# Patient Record
Sex: MALE | Race: WHITE | Employment: FULL TIME | URBAN - METROPOLITAN AREA
[De-identification: names, ages, dates, MRNs, and addresses within clinical notes are randomized per-mention and may not be internally consistent; named-entity substitution may affect disease eponyms.]

---

## 2018-04-12 ENCOUNTER — APPOINTMENT (OUTPATIENT)
Dept: ULTRASOUND IMAGING | Age: 18
End: 2018-04-12
Attending: PEDIATRICS
Payer: COMMERCIAL

## 2018-04-12 ENCOUNTER — HOSPITAL ENCOUNTER (EMERGENCY)
Age: 18
Discharge: HOME | End: 2018-04-13
Attending: PEDIATRICS
Payer: COMMERCIAL

## 2018-04-12 VITALS
HEART RATE: 67 BPM | TEMPERATURE: 98.3 F | SYSTOLIC BLOOD PRESSURE: 105 MMHG | DIASTOLIC BLOOD PRESSURE: 63 MMHG | OXYGEN SATURATION: 98 % | RESPIRATION RATE: 16 BRPM | WEIGHT: 171.08 LBS

## 2018-04-12 DIAGNOSIS — B27.90 MONONUCLEOSIS: Primary | ICD-10-CM

## 2018-04-12 LAB
ALBUMIN SERPL-MCNC: 3.9 G/DL (ref 3.5–5)
ALBUMIN/GLOB SERPL: 0.9 {RATIO} (ref 1.1–2.2)
ALP SERPL-CCNC: 93 U/L (ref 45–117)
ALT SERPL-CCNC: 20 U/L (ref 12–78)
ANION GAP SERPL CALC-SCNC: 7 MMOL/L (ref 5–15)
AST SERPL-CCNC: 16 U/L (ref 15–37)
BASOPHILS # BLD: 0 K/UL (ref 0–0.1)
BASOPHILS NFR BLD: 0 % (ref 0–1)
BILIRUB SERPL-MCNC: 0.7 MG/DL (ref 0.2–1)
BUN SERPL-MCNC: 12 MG/DL (ref 6–20)
BUN/CREAT SERPL: 13 (ref 12–20)
CALCIUM SERPL-MCNC: 9.3 MG/DL (ref 8.5–10.1)
CHLORIDE SERPL-SCNC: 100 MMOL/L (ref 97–108)
CO2 SERPL-SCNC: 27 MMOL/L (ref 21–32)
CREAT SERPL-MCNC: 0.95 MG/DL (ref 0.7–1.3)
DIFFERENTIAL METHOD BLD: ABNORMAL
EOSINOPHIL # BLD: 0.1 K/UL (ref 0–0.4)
EOSINOPHIL NFR BLD: 0 % (ref 0–7)
ERYTHROCYTE [DISTWIDTH] IN BLOOD BY AUTOMATED COUNT: 12.9 % (ref 11.5–14.5)
GLOBULIN SER CALC-MCNC: 4.4 G/DL (ref 2–4)
GLUCOSE SERPL-MCNC: 91 MG/DL (ref 65–100)
HCT VFR BLD AUTO: 40.4 % (ref 36.6–50.3)
HETEROPH AB SER QL: NEGATIVE
HGB BLD-MCNC: 13.6 G/DL (ref 12.1–17)
IMM GRANULOCYTES # BLD: 0 K/UL (ref 0–0.04)
IMM GRANULOCYTES NFR BLD AUTO: 0 % (ref 0–0.5)
LYMPHOCYTES # BLD: 1.8 K/UL (ref 0.8–3.5)
LYMPHOCYTES NFR BLD: 14 % (ref 12–49)
MCH RBC QN AUTO: 29.5 PG (ref 26–34)
MCHC RBC AUTO-ENTMCNC: 33.7 G/DL (ref 30–36.5)
MCV RBC AUTO: 87.6 FL (ref 80–99)
MONOCYTES # BLD: 1.9 K/UL (ref 0–1)
MONOCYTES NFR BLD: 15 % (ref 5–13)
NEUTS SEG # BLD: 8.7 K/UL (ref 1.8–8)
NEUTS SEG NFR BLD: 70 % (ref 32–75)
NRBC # BLD: 0 K/UL (ref 0–0.01)
NRBC BLD-RTO: 0 PER 100 WBC
PLATELET # BLD AUTO: 210 K/UL (ref 150–400)
PMV BLD AUTO: 9.2 FL (ref 8.9–12.9)
POTASSIUM SERPL-SCNC: 4.1 MMOL/L (ref 3.5–5.1)
PROT SERPL-MCNC: 8.3 G/DL (ref 6.4–8.2)
RBC # BLD AUTO: 4.61 M/UL (ref 4.1–5.7)
SODIUM SERPL-SCNC: 134 MMOL/L (ref 136–145)
WBC # BLD AUTO: 12.4 K/UL (ref 4.1–11.1)

## 2018-04-12 PROCEDURE — 99283 EMERGENCY DEPT VISIT LOW MDM: CPT

## 2018-04-12 PROCEDURE — 74011250636 HC RX REV CODE- 250/636: Performed by: PEDIATRICS

## 2018-04-12 PROCEDURE — 96374 THER/PROPH/DIAG INJ IV PUSH: CPT

## 2018-04-12 PROCEDURE — 80053 COMPREHEN METABOLIC PANEL: CPT | Performed by: PEDIATRICS

## 2018-04-12 PROCEDURE — 96375 TX/PRO/DX INJ NEW DRUG ADDON: CPT

## 2018-04-12 PROCEDURE — 86664 EPSTEIN-BARR NUCLEAR ANTIGEN: CPT | Performed by: PEDIATRICS

## 2018-04-12 PROCEDURE — 86308 HETEROPHILE ANTIBODY SCREEN: CPT | Performed by: PEDIATRICS

## 2018-04-12 PROCEDURE — 85025 COMPLETE CBC W/AUTO DIFF WBC: CPT | Performed by: PEDIATRICS

## 2018-04-12 PROCEDURE — 36415 COLL VENOUS BLD VENIPUNCTURE: CPT | Performed by: PEDIATRICS

## 2018-04-12 PROCEDURE — 86060 ANTISTREPTOLYSIN O TITER: CPT | Performed by: PEDIATRICS

## 2018-04-12 PROCEDURE — 76705 ECHO EXAM OF ABDOMEN: CPT

## 2018-04-12 PROCEDURE — 87040 BLOOD CULTURE FOR BACTERIA: CPT | Performed by: PEDIATRICS

## 2018-04-12 PROCEDURE — 87070 CULTURE OTHR SPECIMN AEROBIC: CPT | Performed by: PEDIATRICS

## 2018-04-12 RX ORDER — DEXAMETHASONE SODIUM PHOSPHATE 10 MG/ML
16 INJECTION INTRAMUSCULAR; INTRAVENOUS ONCE
Status: COMPLETED | OUTPATIENT
Start: 2018-04-12 | End: 2018-04-12

## 2018-04-12 RX ORDER — KETOROLAC TROMETHAMINE 30 MG/ML
30 INJECTION, SOLUTION INTRAMUSCULAR; INTRAVENOUS
Status: COMPLETED | OUTPATIENT
Start: 2018-04-12 | End: 2018-04-12

## 2018-04-12 RX ADMIN — SODIUM CHLORIDE 1000 ML: 900 INJECTION, SOLUTION INTRAVENOUS at 23:20

## 2018-04-12 RX ADMIN — DEXAMETHASONE SODIUM PHOSPHATE 16 MG: 10 INJECTION, SOLUTION INTRAMUSCULAR; INTRAVENOUS at 23:20

## 2018-04-12 RX ADMIN — KETOROLAC TROMETHAMINE 30 MG: 30 INJECTION, SOLUTION INTRAMUSCULAR; INTRAVENOUS at 23:20

## 2018-04-13 LAB — S PYO AG THROAT QL: NEGATIVE

## 2018-04-13 PROCEDURE — 87880 STREP A ASSAY W/OPTIC: CPT

## 2018-04-13 RX ORDER — IBUPROFEN 800 MG/1
800 TABLET ORAL
Qty: 20 TAB | Refills: 0 | Status: SHIPPED | OUTPATIENT
Start: 2018-04-13 | End: 2018-04-20

## 2018-04-13 RX ORDER — ONDANSETRON 4 MG/1
4 TABLET, ORALLY DISINTEGRATING ORAL
Qty: 10 TAB | Refills: 0 | Status: SHIPPED | OUTPATIENT
Start: 2018-04-13 | End: 2018-05-01

## 2018-04-13 NOTE — DISCHARGE INSTRUCTIONS
Mononucleosis in Teens: Care Instructions  Your Care Instructions    Mononucleosis, also called mono, is an infection that is usually caused by the Krystal-Barr virus. Mono is spread through contact with saliva, mucus from the nose and throat, and sometimes tears or blood. You can get mono by kissing a person who is infected. Or you may get it by sharing eating utensils or a drinking glass with someone who has mono. Mono may cause your spleen to swell. The spleen is an organ in the upper left side of your belly. A blow to the belly can cause a swollen spleen to break open. In very rare cases, the spleen may burst on its own. Most people recover fully after several weeks. But it may take several months before your normal energy is back. The lymph nodes in your neck may be larger than normal for up to 1 month. Getting lots of rest and keeping your schedule light will help you feel better. Time helps you recover. Follow-up care is a key part of your treatment and safety. Be sure to make and go to all appointments, and call your doctor if you are having problems. It's also a good idea to know your test results and keep a list of the medicines you take. How can you care for yourself at home? · Get plenty of rest. Stay in bed until you feel well enough to be up. · Drink plenty of fluids, enough so that your urine is light yellow or clear like water. If you have kidney, heart, or liver disease and have to limit fluids, talk with your doctor before you increase the amount of fluids you drink. · Take your medicines exactly as prescribed. Call your doctor if you think you are having a problem with your medicine. · For a sore throat, suck on lozenges or gargle with salt water. To make salt water, mix 1 teaspoon of salt in 8 ounces of warm water.   · Take an over-the-counter pain medicine, such as acetaminophen (Tylenol), ibuprofen (Advil, Motrin), or naproxen (Aleve), for a sore throat or headache or to lower a fever. Read and follow all instructions on the label. No one younger than 20 should take aspirin. It has been linked to Reye syndrome, a serious illness. · Do not take two or more pain medicines at the same time unless the doctor told you to. Many pain medicines have acetaminophen, which is Tylenol. Too much acetaminophen (Tylenol) can be harmful. · Do not play contact sports or lift anything heavy for 4 weeks after becoming ill with mono. Too much activity increases the chance that your spleen may break open. · Try not to spread the virus to others. Do not kiss or share dishes, glasses, eating utensils, or toothbrushes for at least 2 weeks. The virus is spread when saliva from an infected person gets in another person's mouth. It is hard to know how long you may be contagious. · If you know you have mono, do not donate blood. There is a chance of spreading the virus through blood products. When should you call for help? Call 911 anytime you think you may need emergency care. For example, call if:  ? · You passed out (lost consciousness). ?Call your doctor now or seek immediate medical care if:  ? · You have new or worse belly pain. ? · You have signs of needing more fluids. You have sunken eyes and a dry mouth, and you pass only a little urine. ? · You are dizzy or lightheaded, or you feel like you may faint. ? · You cannot swallow fluids. ? Watch closely for changes in your health, and be sure to contact your doctor if:  ? · You do not get better as expected. Where can you learn more? Go to http://attila-jeffrey.info/. Enter  in the search box to learn more about \"Mononucleosis in Teens: Care Instructions. \"  Current as of: March 3, 2017  Content Version: 11.4  © 1759-4222 [employer]. Care instructions adapted under license by [employer] (which disclaims liability or warranty for this information).  If you have questions about a medical condition or this instruction, always ask your healthcare professional. [place] [address] any warranty or liability for your use of this information. We hope that we have addressed all of your medical concerns. The examination and treatment you received in the Emergency Department were for an emergent problem and were not intended as complete care. It is important that you follow up with your healthcare provider(s) for ongoing care. If your symptoms worsen or do not improve as expected, and you are unable to reach your usual health care provider(s), you should return to the Emergency Department. Today's healthcare is undergoing tremendous change, and patient satisfaction surveys are one of the many tools to assess the quality of medical care. You may receive a survey from the [employer] organization regarding your experience in the Emergency Department. I hope that your experience has been completely positive, particularly the medical care that I provided. As such, please participate in the survey; anything less than excellent does not meet my expectations or intentions. Thank you for allowing us to provide you with medical care today. We realize that you have many choices for your emergency care needs. Please choose us in the future for any continued health care needs.       Shamar Castillo MD    [facility], [facility].   Office: [phone]            Recent Results (from the past 24 hour(s))   METABOLIC PANEL, COMPREHENSIVE    Collection Time: 04/12/18 11:11 PM   Result Value Ref Range    Sodium 134 (L) 136 - 145 mmol/L    Potassium 4.1 3.5 - 5.1 mmol/L    Chloride 100 97 - 108 mmol/L    CO2 27 21 - 32 mmol/L    Anion gap 7 5 - 15 mmol/L    Glucose 91 65 - 100 mg/dL    BUN 12 6 - 20 MG/DL    Creatinine 0.95 0.70 - 1.30 MG/DL    BUN/Creatinine ratio 13 12 - 20      GFR est AA >60 >60 ml/min/1.73m2    GFR est non-AA >60 >60 ml/min/1.73m2    Calcium 9.3 8.5 - 10.1 MG/DL    Bilirubin, total 0.7 0.2 - 1.0 MG/DL    ALT (SGPT) 20 12 - 78 U/L    AST (SGOT) 16 15 - 37 U/L    Alk. phosphatase 93 45 - 117 U/L    Protein, total 8.3 (H) 6.4 - 8.2 g/dL    Albumin 3.9 3.5 - 5.0 g/dL    Globulin 4.4 (H) 2.0 - 4.0 g/dL    A-G Ratio 0.9 (L) 1.1 - 2.2     CBC WITH AUTOMATED DIFF    Collection Time: 04/12/18 11:11 PM   Result Value Ref Range    WBC 12.4 (H) 4.1 - 11.1 K/uL    RBC 4.61 4.10 - 5.70 M/uL    HGB 13.6 12.1 - 17.0 g/dL    HCT 40.4 36.6 - 50.3 %    MCV 87.6 80.0 - 99.0 FL    MCH 29.5 26.0 - 34.0 PG    MCHC 33.7 30.0 - 36.5 g/dL    RDW 12.9 11.5 - 14.5 %    PLATELET 477 952 - 247 K/uL    MPV 9.2 8.9 - 12.9 FL    NRBC 0.0 0  WBC    ABSOLUTE NRBC 0.00 0.00 - 0.01 K/uL    NEUTROPHILS 70 32 - 75 %    LYMPHOCYTES 14 12 - 49 %    MONOCYTES 15 (H) 5 - 13 %    EOSINOPHILS 0 0 - 7 %    BASOPHILS 0 0 - 1 %    IMMATURE GRANULOCYTES 0 0.0 - 0.5 %    ABS. NEUTROPHILS 8.7 (H) 1.8 - 8.0 K/UL    ABS. LYMPHOCYTES 1.8 0.8 - 3.5 K/UL    ABS. MONOCYTES 1.9 (H) 0.0 - 1.0 K/UL    ABS. EOSINOPHILS 0.1 0.0 - 0.4 K/UL    ABS. BASOPHILS 0.0 0.0 - 0.1 K/UL    ABS. IMM. GRANS. 0.0 0.00 - 0.04 K/UL    DF AUTOMATED     MONONUCLEOSIS SCREEN    Collection Time: 04/12/18 11:11 PM   Result Value Ref Range    Mononucleosis screen NEGATIVE  NEG         [employer]    Result Date: 4/12/2018  Limited abdominal ultrasound HISTORY: Intermittent fever. Evaluation of splenic size. Targeted ultrasound of the area of interest demonstrates a homogeneous spleen that measures 12.8 x 5.9 x 11.4 cm, corresponding to a volume of 451 mL. IMPRESSION: Splenomegaly.

## 2018-04-13 NOTE — ED PROVIDER NOTES
Patient is a 21 y.o. male presenting with sore throat. The history is provided by the patient and a parent. Sore Throat    This is a new problem. Episode onset: 2 weeks ago with sorethroat and fever, abd pain. Treated by  with ABx and tamiflu. was betetr for a day but throat worse. Saw PCp 2 days ago and Neg Monospot,flu and strep. Started on augmentin. The problem has not changed (Still with low energy, HA, Sore throat and fever to 102) since onset. There has been a fever of 102 - 102.9 F. The fever has been present for 3 - 4 days. Associated symptoms include headaches, swollen glands and trouble swallowing (pain with swallwoing). Pertinent negatives include no diarrhea, no vomiting, no congestion, no drooling, no ear discharge, no ear pain, no shortness of breath, no stridor and no cough. He has had no exposure to strep or mono. He has tried NSAIDs and acetaminophen for the symptoms. The treatment provided mild relief. Past Medical History:   Diagnosis Date    Asthma        History reviewed. No pertinent surgical history. History reviewed. No pertinent family history. Social History     Social History    Marital status: SINGLE     Spouse name: N/A    Number of children: N/A    Years of education: N/A     Occupational History    Not on file. Social History Main Topics    Smoking status: Never Smoker    Smokeless tobacco: Never Used    Alcohol use Not on file    Drug use: Not on file    Sexual activity: Not on file     Other Topics Concern    Not on file     Social History Narrative    No narrative on file         ALLERGIES: Review of patient's allergies indicates no known allergies. Review of Systems   Constitutional: Positive for activity change, fatigue and fever. Negative for appetite change and chills. HENT: Positive for sore throat and trouble swallowing (pain with swallwoing). Negative for congestion, drooling, ear discharge, ear pain and rhinorrhea.     Eyes: Negative for photophobia and visual disturbance. Respiratory: Negative for cough, shortness of breath, wheezing and stridor. Cardiovascular: Negative for chest pain. Gastrointestinal: Negative for abdominal pain, diarrhea and vomiting. Endocrine: Negative for polydipsia and polyuria. Genitourinary: Negative for decreased urine volume and dysuria. Musculoskeletal: Negative for back pain, neck pain and neck stiffness. Skin: Negative for rash. Allergic/Immunologic: Negative for immunocompromised state. Neurological: Positive for headaches. Hematological: Positive for adenopathy. Does not bruise/bleed easily. Psychiatric/Behavioral: Negative for confusion. Vitals:    04/12/18 2056 04/12/18 2133   BP: 102/63    Pulse: (!) 101    Resp: 20    Temp: 100.1 °F (37.8 °C) 98.5 °F (36.9 °C)   SpO2: 97%    Weight: 77.6 kg (171 lb 1.2 oz)             Physical Exam   Physical Exam   Constitutional: Appears well-developed and well-nourished. active. No distress. HENT:   Head: NCAT  Ears: Right Ear: Tympanic membrane normal. Left Ear: Tympanic membrane normal.   Nose: Nose normal. No nasal discharge. Mouth/Throat: Mucous membranes are moist. Pharynx is normal. tonsils very swollen and exudates  Eyes: Conjunctivae are normal. Right eye exhibits no discharge. Left eye exhibits no discharge. Neck: Normal range of motion. Neck supple. Cardiovascular: Normal rate, regular rhythm, S1 normal and S2 normal.  No murmur. 2+ distal pulses   Pulmonary/Chest: Effort normal and breath sounds normal. No nasal flaring or stridor. No respiratory distress. no wheezes. no rhonchi. no rales. no retraction. Abdominal: Soft. . No tenderness. no guarding. No hernia. No masses or HSM appreciated on exam  Musculoskeletal: Normal range of motion. no edema, no tenderness, no deformity and no signs of injury. Lymphadenopathy: b/l cervical adenopathy. Neurological:  alert. normal strength. normal muscle tone.  No focal deficits. EOMI PERRL  Skin: Skin is warm and dry. Capillary refill takes less than 3 seconds. Turgor is normal. No petechiae, no purpura and no rash noted. No cyanosis. MDM    Patient is well hydrated, well appearing, and in no respiratory distress. Physical exam is reassuring, and without signs of serious illness. Pt with clinical signs and symptoms, as well as laboratory findings consistent with mononucleosis. Mono spot negative. Sending Throat Cx but with Monos, large spleen and exam findings Mono most likely. No evidence of airway compromise or peritonsillar abscess. Gave bolus, toradol and decadron. Patient is stable for discharge home with pain control, oral hydration. Throat Cx pending. I d not see need for ABx but family can cont if they choose. Will have patient follow up with PCP in 1-2 days. Instructed to call or return with worsening pain, respiratory distress, trismus, or other concerning symptoms. Awaiting on Cx, EBV panel and ASO. ICD-10-CM ICD-9-CM   1. Mononucleosis B27.90 075       Current Discharge Medication List      START taking these medications    Details   ibuprofen (MOTRIN) 800 mg tablet Take 1 Tab by mouth every six (6) hours as needed for Pain for up to 7 days. Qty: 20 Tab, Refills: 0      ondansetron (ZOFRAN ODT) 4 mg disintegrating tablet Take 1 Tab by mouth every eight (8) hours as needed for Nausea for up to 10 doses. Qty: 10 Tab, Refills: 0             Follow-up Information     Follow up With Details Comments Contact Info    [facility] In 3 days            I have reviewed discharge instructions with the caregiver. The patient verbalized understanding.     12:06 AM  Erica Hoover M.D.    ED Course       Procedures

## 2018-04-13 NOTE — ED NOTES
Pt discharged home with parent/guardian. Pt acting age appropriately, respirations regular and unlabored, cap refill less than two seconds. Skin pink, dry and warm. Lungs clear bilaterally. No further complaints at this time. Pt verbalized understanding of discharge paperwork and has no further questions at this time. Education provided about continuation of care, follow up care and medication administration. Pt able to provided teach back about discharge instructions.

## 2018-04-13 NOTE — ED TRIAGE NOTES
Sick on and off for 2 weeks with intermittent fever. Urgent care gave him tamiflu and an antibiotic because they didn't know what it was per family. Tested on Tuesday for mono that was negative. Today his fever was 103 with body aches and a headache.

## 2018-04-14 LAB
ASO AB SERPL-ACNC: 113 IU/ML (ref 0–200)
BACTERIA SPEC CULT: NORMAL
EBV EA IGG SER-ACNC: <9 U/ML (ref 0–8.9)
EBV NA IGG SER-ACNC: 112 U/ML (ref 0–17.9)
EBV VCA IGG SER-ACNC: 82 U/ML (ref 0–17.9)
EBV VCA IGM SER-ACNC: <36 U/ML (ref 0–35.9)
INTERPRETATION, 169995: ABNORMAL
SERVICE CMNT-IMP: NORMAL

## 2018-04-18 LAB
BACTERIA SPEC CULT: NORMAL
SERVICE CMNT-IMP: NORMAL

## 2018-04-25 ENCOUNTER — HOSPITAL ENCOUNTER (OUTPATIENT)
Dept: ULTRASOUND IMAGING | Age: 18
Discharge: HOME | End: 2018-04-25
Attending: PEDIATRICS
Payer: COMMERCIAL

## 2018-04-25 DIAGNOSIS — B27.90 MONONUCLEOSIS: ICD-10-CM

## 2018-04-25 PROCEDURE — 76705 ECHO EXAM OF ABDOMEN: CPT

## 2018-05-01 ENCOUNTER — OFFICE VISIT (OUTPATIENT)
Dept: INTERNAL MEDICINE CLINIC | Age: 18
End: 2018-05-01

## 2018-05-01 VITALS
RESPIRATION RATE: 18 BRPM | HEART RATE: 75 BPM | HEIGHT: 71 IN | DIASTOLIC BLOOD PRESSURE: 64 MMHG | TEMPERATURE: 98 F | SYSTOLIC BLOOD PRESSURE: 112 MMHG | WEIGHT: 167 LBS | BODY MASS INDEX: 23.38 KG/M2 | OXYGEN SATURATION: 98 %

## 2018-05-01 DIAGNOSIS — Z87.898 H/O SPLENOMEGALY: ICD-10-CM

## 2018-05-01 DIAGNOSIS — Z23 ENCOUNTER FOR IMMUNIZATION: Primary | ICD-10-CM

## 2018-05-01 PROBLEM — J45.909 CHILDHOOD ASTHMA: Status: ACTIVE | Noted: 2018-05-01

## 2018-05-01 NOTE — MR AVS SNAPSHOT
[address] [facility], [address] [place] [address] 
[phone] Patient: Andry Garduno MRN: [id number] :[id number] Visit Information Date & Time Provider Department Dept. Phone Encounter #  
 2018  1:40 PM [facility] [phone] Follow-up Instructions Return in about 1 year (around 2019) for CPE. Upcoming Health Maintenance Date Due Hepatitis A Peds Age 1-18 (1 of 2 - Standard Series) 1999 DTaP/Tdap/Td series (1 - Tdap) 2005 HPV Age 9Y-34Y (1 of 1 - Male 3 Dose Series) 2009 Influenza Age 5 to Adult 2018 Allergies as of 2018  Review Complete On: 2018 By: Bonita Herrera LPN No Known Allergies Current Immunizations  Never Reviewed No immunizations on file. Not reviewed this visit You Were Diagnosed With   
  
 Codes Comments Encounter for immunization    -  Primary ICD-10-CM: V64 ICD-9-CM: V03.89 Vitals BP Pulse Temp Resp Height(growth percentile) 112/64 (14 %/ 12 %)* (BP 1 Location: Right arm, BP Patient Position: Sitting) 75 98 °F (36.7 °C) (Oral) 18 5' 11\" (1.803 m) (69 %, Z= 0.51) Weight(growth percentile) SpO2 BMI Smoking Status 167 lb (75.8 kg) (69 %, Z= 0.49) 98% 23.29 kg/m2 (57 %, Z= 0.18) Never Smoker *BP percentiles are based on NHBPEP's 4th Report Growth percentiles are based on CDC 2-20 Years data. BMI and BSA Data Body Mass Index Body Surface Area  
 23.29 kg/m 2 1.95 m 2 Preferred Pharmacy Pharmacy Name Phone [id number] [address] [phone] Your Updated Medication List  
  
   
This list is accurate as of 18  2:01 PM.  Always use your most recent med list.  
  
  
  
  
 human papillomav vac,9-ciera(PF) Susp injection Commonly known as:  GARDASIL  
 0.5 mL by IntraMUSCular route once for 1 dose. ondansetron 4 mg disintegrating tablet Commonly known as:  ZOFRAN ODT Take 1 Tab by mouth every eight (8) hours as needed for Nausea for up to 10 doses. RHINOCORT AQUA 32 mcg/actuation nasal spray Generic drug:  budesonide  
by Nasal route. SINGULAIR 10 mg tablet Generic drug:  montelukast  
Take 10 mg by mouth daily. SYMBICORT 80-4.5 mcg/actuation Hfaa Generic drug:  budesonide-formoterol Take 2 Puffs by inhalation two (2) times a day. Prescriptions Sent to Pharmacy Refills  
 human papillomav vac,9-ciera,PF, (GARDASIL) susp injection 1 Si.5 mL by IntraMUSCular route once for 1 dose. Class: Normal  
 Pharmacy: [id number]- Sangeeta Adamson, [address] [place] [address] #: [phone] Route: IntraMUSCular Follow-up Instructions Return in about 1 year (around 2019) for CPE. Patient Instructions HPV (Human Papillomavirus) Vaccine Gardasil®: What You Need to Know What is HPV? Genital human papillomavirus (HPV) is the most common sexually transmitted virus in the [place]. More than half of sexually active men and women are infected with HPV at some time in their lives. About 20 million Americans are currently infected, and about 6 million more get infected each year. HPV is usually spread through sexual contact. Most HPV infections don't cause any symptoms, and go away on their own. But HPV can cause cervical cancer in women. Cervical cancer is the 2nd leading cause of cancer deaths among women around the world. In the [place], about 12,000 women get cervical cancer every year and about 4,000 are expected to die from it.  
HPV is also associated with several less common cancers, such as vaginal and vulvar cancers in women, and anal and oropharyngeal (back of the throat, including base of tongue and tonsils) cancers in both men and women. HPV can also cause genital warts and warts in the throat. There is no cure for HPV infection, but some of the problems it causes can be treated. HPV vaccine-Why get vaccinated? The HPV vaccine you are getting is one of two vaccines that can be given to prevent HPV. It may be given to both males and females. This vaccine can prevent most cases of cervical cancer in females, if it is given before exposure to the virus. In addition, it can prevent vaginal and vulvar cancer in females, and genital warts and anal cancer in both males and females. Protection from HPV vaccine is expected to be long-lasting. But vaccination is not a substitute for cervical cancer screening. Women should still get regular Pap tests. Who should get this HPV vaccine and when? HPV vaccine is given as a 3-dose series · 1st Dose: Now 
· 2nd Dose: 1 to 2 months after Dose 1 · 3rd Dose: 6 months after Dose 1 Additional (booster) doses are not recommended. Routine vaccination · This HPV vaccine is recommended for girls and boys 6or 15years of age. It may be given starting at age 5. Why is HPV vaccine recommended at 6or 15years of age? HPV infection is easily acquired, even with only one sex partner. That is why it is important to get HPV vaccine before any sexual contact takes place. Also, response to the vaccine is better at this age than at older ages. Catch-up vaccination This vaccine is recommended for the following people who have not completed the 3-dose series: · Females 15 through 32years of age · Males 15 through 24years of age This vaccine may be given to men 25 through 32years of age who have not completed the 3-dose series. It is recommended for men through age 32 who have sex with men or whose immune system is weakened because of HIV infection, other illness, or medications. HPV vaccine may be given at the same time as other vaccines. Some people should not get HPV vaccine or should wait · Anyone who has ever had a life-threatening allergic reaction to any component of HPV vaccine, or to a previous dose of HPV vaccine, should not get the vaccine. Tell your doctor if the person getting vaccinated has any severe allergies, including an allergy to yeast. 
· HPV vaccine is not recommended for pregnant women. However, receiving HPV vaccine when pregnant is not a reason to consider terminating the pregnancy. Women who are breast feeding may get the vaccine. · People who are mildly ill when a dose of HPV vaccine is planned can still be vaccinated. People with a moderate or severe illness should wait until they are better. What are the risks from this vaccine? This HPV vaccine has been used in the [place] and around the world for about six years and has been very safe. However, any medicine could possibly cause a serious problem, such as a severe allergic reaction. The risk of any vaccine causing a serious injury, or death, is extremely small. Life-threatening allergic reactions from vaccines are very rare. If they do occur, it would be within a few minutes to a few hours after the vaccination. Several mild to moderate problems are known to occur with this HPV vaccine. These do not last long and go away on their own. · Reactions in the arm where the shot was given: 
¨ Pain (about 8 people in 10) ¨ Redness or swelling (about 1 person in 4) · Fever ¨ Mild (100°F) (about 1 person in 10) ¨ Moderate (102°F) (about 1 person in 72) · Other problems: 
¨ Headache (about 1 person in 3) · Fainting: Brief fainting spells and related symptoms (such as jerking movements) can happen after any medical procedure, including vaccination. Sitting or lying down for about 15 minutes after a vaccination can help prevent fainting and injuries caused by falls. Tell your doctor if the patient feels dizzy or light-headed, or has vision changes or ringing in the ears. Like all vaccines, HPV vaccines will continue to be monitored for unusual or severe problems. What if there is a serious reaction? What should I look for? · Look for anything that concerns you, such as signs of a severe allergic reaction, very high fever, or behavior changes. Signs of a severe allergic reaction can include hives, swelling of the face and throat, difficulty breathing, a fast heartbeat, dizziness, and weakness. These would start a few minutes to a few hours after the vaccination. What should I do? · If you think it is a severe allergic reaction or other emergency that can't wait, call 9-1-1 or get the person to the nearest hospital. Otherwise, call your doctor. · Afterward, the reaction should be reported to the Vaccine Adverse Event Reporting System (VAERS). Your doctor might file this report, or you can do it yourself through the VAERS web site at www.vaers. [employer].gov, or by calling 1-803.102.3772. VAERS is only for reporting reactions. They do not give medical advice. The National Vaccine Injury Compensation Program 
The National Vaccine Injury Compensation Program (VICP) is a federal program that was created to compensate people who may have been injured by certain vaccines. Persons who believe they may have been injured by a vaccine can learn about the program and about filing a claim by calling 5-615.476.8454 or visiting the [employer] website at www.[facility].gov/vaccinecompensation. How can I learn more? · Ask your doctor. · Call your local or state health department. · Contact the Centers for Disease Control and Prevention (CDC): 
¨ Call 4-277.312.2891 (1-800-CDC-INFO) or ¨ Visit the CDC's website at www.cdc.gov/vaccines. Vaccine Information Statement (Interim) HPV Vaccine (Gardasil) 
(5/17/2013) 42 U. [employer] Cons 873AQ-52 Department of [facility] and [employer] Centers for Disease Control and Prevention Many Vaccine Information Statements are available in Latvian and other languages. See www.immunize.org/vis. Muchas hojas de información sobre vacunas están disponibles en español y en otros idiomas. Visite www.immunize.org/vis. Care instructions adapted under license by [employer] (which disclaims liability or warranty for this information). If you have questions about a medical condition or this instruction, always ask your healthcare professional. [address] any warranty or liability for your use of this information. Introducing hospitals & HEALTH SERVICES! Dear Esvin Jaramillo: Thank you for requesting a Latinda account. Our records indicate that you already have an active Latinda account. You can access your account anytime at [email or web address] Did you know that you can access your hospital and ER discharge instructions at any time in Latinda? You can also review all of your test results from your hospital stay or ER visit. Additional Information If you have questions, please visit the Frequently Asked Questions section of the [employer]inda website at [email or web address] Remember, Latinda is NOT to be used for urgent needs. For medical emergencies, dial 911. Now available from your iPhone and Android! Please provide this summary of care documentation to your next provider. Your primary care clinician is listed as [address]. If you have any questions after today's visit, please call [phone].

## 2018-05-01 NOTE — PATIENT INSTRUCTIONS
HPV (Human Papillomavirus) Vaccine Gardasil®: What You Need to Know  What is HPV? Genital human papillomavirus (HPV) is the most common sexually transmitted virus in the [place]. More than half of sexually active men and women are infected with HPV at some time in their lives. About 20 million Americans are currently infected, and about 6 million more get infected each year. HPV is usually spread through sexual contact. Most HPV infections don't cause any symptoms, and go away on their own. But HPV can cause cervical cancer in women. Cervical cancer is the 2nd leading cause of cancer deaths among women around the world. In the [place], about 12,000 women get cervical cancer every year and about 4,000 are expected to die from it. HPV is also associated with several less common cancers, such as vaginal and vulvar cancers in women, and anal and oropharyngeal (back of the throat, including base of tongue and tonsils) cancers in both men and women. HPV can also cause genital warts and warts in the throat. There is no cure for HPV infection, but some of the problems it causes can be treated. HPV vaccine-Why get vaccinated? The HPV vaccine you are getting is one of two vaccines that can be given to prevent HPV. It may be given to both males and females. This vaccine can prevent most cases of cervical cancer in females, if it is given before exposure to the virus. In addition, it can prevent vaginal and vulvar cancer in females, and genital warts and anal cancer in both males and females. Protection from HPV vaccine is expected to be long-lasting. But vaccination is not a substitute for cervical cancer screening. Women should still get regular Pap tests. Who should get this HPV vaccine and when? HPV vaccine is given as a 3-dose series  · 1st Dose: Now  · 2nd Dose: 1 to 2 months after Dose 1  · 3rd Dose: 6 months after Dose 1  Additional (booster) doses are not recommended.   Routine vaccination  · This HPV vaccine is recommended for girls and boys 6or 15years of age. It may be given starting at age 5. Why is HPV vaccine recommended at 6or 15years of age? HPV infection is easily acquired, even with only one sex partner. That is why it is important to get HPV vaccine before any sexual contact takes place. Also, response to the vaccine is better at this age than at older ages. Catch-up vaccination  This vaccine is recommended for the following people who have not completed the 3-dose series:  · Females 15 through 32years of age  · Males 15 through 24years of age  This vaccine may be given to men 25 through 32years of age who have not completed the 3-dose series. It is recommended for men through age 32 who have sex with men or whose immune system is weakened because of HIV infection, other illness, or medications. HPV vaccine may be given at the same time as other vaccines. Some people should not get HPV vaccine or should wait  · Anyone who has ever had a life-threatening allergic reaction to any component of HPV vaccine, or to a previous dose of HPV vaccine, should not get the vaccine. Tell your doctor if the person getting vaccinated has any severe allergies, including an allergy to yeast.  · HPV vaccine is not recommended for pregnant women. However, receiving HPV vaccine when pregnant is not a reason to consider terminating the pregnancy. Women who are breast feeding may get the vaccine. · People who are mildly ill when a dose of HPV vaccine is planned can still be vaccinated. People with a moderate or severe illness should wait until they are better. What are the risks from this vaccine? This HPV vaccine has been used in the [place] and around the world for about six years and has been very safe. However, any medicine could possibly cause a serious problem, such as a severe allergic reaction.  The risk of any vaccine causing a serious injury, or death, is extremely small.  Life-threatening allergic reactions from vaccines are very rare. If they do occur, it would be within a few minutes to a few hours after the vaccination. Several mild to moderate problems are known to occur with this HPV vaccine. These do not last long and go away on their own. · Reactions in the arm where the shot was given:  ¨ Pain (about 8 people in 10)  ¨ Redness or swelling (about 1 person in 4)  · Fever  ¨ Mild (100°F) (about 1 person in 10)  ¨ Moderate (102°F) (about 1 person in 65)  · Other problems:  ¨ Headache (about 1 person in 3)  · Fainting: Brief fainting spells and related symptoms (such as jerking movements) can happen after any medical procedure, including vaccination. Sitting or lying down for about 15 minutes after a vaccination can help prevent fainting and injuries caused by falls. Tell your doctor if the patient feels dizzy or light-headed, or has vision changes or ringing in the ears. Like all vaccines, HPV vaccines will continue to be monitored for unusual or severe problems. What if there is a serious reaction? What should I look for? · Look for anything that concerns you, such as signs of a severe allergic reaction, very high fever, or behavior changes. Signs of a severe allergic reaction can include hives, swelling of the face and throat, difficulty breathing, a fast heartbeat, dizziness, and weakness. These would start a few minutes to a few hours after the vaccination. What should I do? · If you think it is a severe allergic reaction or other emergency that can't wait, call 9-1-1 or get the person to the nearest hospital. Otherwise, call your doctor. · Afterward, the reaction should be reported to the Vaccine Adverse Event Reporting System (VAERS). Your doctor might file this report, or you can do it yourself through the VAERS web site at www.vaers. hhs.gov, or by calling 1-239.934.5651. VAERS is only for reporting reactions. They do not give medical advice.   The National Vaccine Injury Compensation Program  The National Vaccine Injury Compensation Program (VICP) is a federal program that was created to compensate people who may have been injured by certain vaccines. Persons who believe they may have been injured by a vaccine can learn about the program and about filing a claim by calling 9-529.747.5021 or visiting the [employer] website at www.[facility].gov/vaccinecompensation. How can I learn more? · Ask your doctor. · Call your local or state health department. · Contact the Centers for Disease Control and Prevention (CDC):  ¨ Call 4-481.406.1336 (1-800-CDC-INFO) or  ¨ Visit the CDC's website at www.cdc.gov/vaccines. Vaccine Information Statement (Interim)  HPV Vaccine (Gardasil)  (5/17/2013)  42 YANDY Wongsam Phelps [id number]  Department of Health and Human Services  Centers for Disease Control and Prevention  Many Vaccine Information Statements are available in Yoruba and other languages. See www.immunize.org/vis. Muchas hojas de información sobre vacunas están disponibles en español y en otros idiomas. Visite www.immunize.org/vis. Care instructions adapted under license by [employer] (which disclaims liability or warranty for this information). If you have questions about a medical condition or this instruction, always ask your healthcare professional. [address] any warranty or liability for your use of this information.

## 2018-05-01 NOTE — PROGRESS NOTES
Subjective:      Stephanie White is a 21 y.o. male who presents today for new patient     Previously followed by [address] [place] [address] to LAUREN Bartlett [facility] business administration degree    Childhood Asthma: improved with age. No wheezing. Has albuterol if needed, hasn't need in years    Had 1 of 3 HPV vaccinations, done 03/2017    Goes to the gym 5 days a week, mostly weight, some cardio  Diet: eggs for breakfast, chicken for lunch, moom cooks for dinner. Not a lot of fast food    Was seen earlier this month in the ED for sore throat, fatigue. Monospot negative, however EBV positive. Splenomegally was noted on ultrasound. Repeat ultrasound last week showing improvement in splenomegally. Patient rpeorting that he feels 100% better  Denies fever, chills, sore throat, malaise or fatigue, abdominal pain, HAs or dizziness  Feels energetic and back to his normal state of health    Patient Active Problem List    Diagnosis Date Noted    Childhood asthma 05/01/2018     Current Outpatient Prescriptions   Medication Sig Dispense Refill    human papillomav vac,9-ciera,PF, (GARDASIL) susp injection 0.5 mL by IntraMUSCular route once for 1 dose. 0.5 mL 1    ondansetron (ZOFRAN ODT) 4 mg disintegrating tablet Take 1 Tab by mouth every eight (8) hours as needed for Nausea for up to 10 doses. 10 Tab 0    montelukast (SINGULAIR) 10 mg tablet Take 10 mg by mouth daily.  budesonide-formoterol (SYMBICORT) 80-4.5 mcg/Actuation HFAA inhaler Take 2 Puffs by inhalation two (2) times a day.  budesonide (RHINOCORT AQUA) 32 mcg/Actuation nasal spray by Nasal route. Review of Systems    Pertinent items are noted in HPI.      Objective:     Visit Vitals    /64 (BP 1 Location: Right arm, BP Patient Position: Sitting)    Pulse 75    Temp 98 °F (36.7 °C) (Oral)    Resp 18    Ht 5' 11\" (1.803 m)    Wt 167 lb (75.8 kg)    SpO2 98%    BMI 23.29 kg/m2     General appearance: alert, cooperative, no distress, appears stated age  Head: Normocephalic, without obvious abnormality, atraumatic  Eyes: conjunctivae/corneas clear. PERRL, EOM's intact. Neck: supple, symmetrical, trachea midline, no adenopathy, thyroid: not enlarged, no JVD  Lungs: clear to auscultation bilaterally  Heart: regular rate and rhythm, S1, S2 normal, no murmur, click, rub or gallop  Abdomen: soft, non-tender. Bowel sounds normal. No masses,  no organomegaly  Extremities: extremities normal, atraumatic, no cyanosis or edema, steady gait  Skin: Skin color, texture, turgor normal.   Neurologic: Grossly normal  Psych: energetic, cooperative, smiling, appropriate affect    Assessment/Plan:     1. Encounter for immunization  - reviewed medical records, has had 1:2 gardasil vaccinations  - patient agreeable to completing course  - human papillomav vac,9-ciera,PF, (GARDASIL) susp injection; 0.5 mL by IntraMUSCular route once for 1 dose. Dispense: 0.5 mL; Refill: 1    2. H/O splenomegaly  - ultrasound last week showed significant improvement  - pt feeling 100% better  - pt to let me know if worsens, or symptoms return  - reviewed previous ultrasounds and lab work      Advised him to call back or return to office if symptoms worsen/change/persist.  Discussed expected course/resolution/complications of diagnosis in detail with patient. Medication risks/benefits/costs/interactions/alternatives discussed with patient. He was given an after visit summary which includes diagnoses, current medications, & vitals. He expressed understanding with the diagnosis and plan.     ARNOL Mcfarland

## 2018-05-31 ENCOUNTER — OFFICE VISIT (OUTPATIENT)
Dept: INTERNAL MEDICINE CLINIC | Age: 18
End: 2018-05-31

## 2018-05-31 VITALS
DIASTOLIC BLOOD PRESSURE: 78 MMHG | OXYGEN SATURATION: 98 % | BODY MASS INDEX: 23.52 KG/M2 | HEIGHT: 71 IN | WEIGHT: 168 LBS | SYSTOLIC BLOOD PRESSURE: 124 MMHG | RESPIRATION RATE: 15 BRPM | TEMPERATURE: 99 F | HEART RATE: 79 BPM

## 2018-05-31 DIAGNOSIS — H65.02 ACUTE SEROUS OTITIS MEDIA OF LEFT EAR, RECURRENCE NOT SPECIFIED: ICD-10-CM

## 2018-05-31 DIAGNOSIS — J03.90 ACUTE TONSILLITIS, UNSPECIFIED ETIOLOGY: Primary | ICD-10-CM

## 2018-05-31 DIAGNOSIS — J02.9 SORE THROAT: ICD-10-CM

## 2018-05-31 DIAGNOSIS — R77.1 ELEVATED SERUM GLOBULIN LEVEL: ICD-10-CM

## 2018-05-31 DIAGNOSIS — R76.8 EPSTEIN-BARR VIRUS SEROPOSITIVITY: ICD-10-CM

## 2018-05-31 DIAGNOSIS — J30.9 ALLERGIC RHINITIS, UNSPECIFIED SEASONALITY, UNSPECIFIED TRIGGER: ICD-10-CM

## 2018-05-31 DIAGNOSIS — D72.829 LEUKOCYTOSIS, UNSPECIFIED TYPE: ICD-10-CM

## 2018-05-31 LAB
S PYO AG THROAT QL: NEGATIVE
VALID INTERNAL CONTROL?: YES

## 2018-05-31 RX ORDER — AMOXICILLIN AND CLAVULANATE POTASSIUM 875; 125 MG/1; MG/1
1 TABLET, FILM COATED ORAL EVERY 12 HOURS
Qty: 20 TAB | Refills: 0 | Status: SHIPPED | OUTPATIENT
Start: 2018-05-31 | End: 2018-06-10

## 2018-05-31 RX ORDER — CETIRIZINE HCL 10 MG
TABLET ORAL
COMMUNITY
End: 2018-07-09

## 2018-05-31 NOTE — PROGRESS NOTES
Patient's identity verified with two patient identifiers (name and date of birth). Reviewed record in preparation for visit and have obtained necessary documentation. 1. Have you been to the ER, urgent care clinic since your last visit? Hospitalized since your last visit? No  2. Have you seen or consulted any other health care providers outside of the [address] [place] [address] since your last visit? Include any pap smears or colon screening. No    Chief Complaint   Patient presents with    Cold Symptoms     Complains of sore throat, body aches, nasal congestion, cough, Left ear pain, x3 days. Recurring per dad, occurs q6wks. Tested for Mono x1mos, no result. Takes Ibuprofen w/ relief, no other meds. Apetite is fine, normal BM, some fatigue, but sleeps normal per pt. Not fasting. Health Maintenance Due   Topic Date Due    HPV Age 9Y-34Y (2 of 1 - Male 3 Dose Series) 05/03/2017    Pneumococcal 19-64 Medium Risk (1 of 1 - PPSV23) 11/18/2017       Wt Readings from Last 3 Encounters:   05/31/18 168 lb (76.2 kg) (70 %, Z= 0.51)*   05/01/18 167 lb (75.8 kg) (69 %, Z= 0.49)*   04/12/18 171 lb 1.2 oz (77.6 kg) (74 %, Z= 0.63)*     * Growth percentiles are based on CDC 2-20 Years data. Temp Readings from Last 3 Encounters:   05/31/18 99 °F (37.2 °C) (Oral)   05/01/18 98 °F (36.7 °C) (Oral)   04/12/18 98.3 °F (36.8 °C)     BP Readings from Last 3 Encounters:   05/31/18 124/78   05/01/18 112/64   04/12/18 105/63     Pulse Readings from Last 3 Encounters:   05/31/18 79   05/01/18 75   04/12/18 67     Patient is accompanied by father I have received verbal consent from Filiberto Rodriguez to discuss any/all medical information while they are present in the room.

## 2018-05-31 NOTE — PROGRESS NOTES
22 yo male presents with 3 day hx of sore throat, myalgia, nasal congestion, cough and L ear pain. His father, who is with him today, reports he is having a similar set of symptoms every 6 weeks for the past year. He was seen in ED on 4/12, and found to have splenomegaly and + Krystal Rojas ([place] spot neg). He was seen here May 1, at which time he reported feeling 100% improved. Repeat U/S of spleen also showed improvement. He saw his Pediatrician, and was cleared to return to work. He had asthma/allergies in childhood and needed maintenance tx, but no other illnesses of note. PE: WNWD WM is alert and oriented, and accompanied by his father   T - 80   Phar - large, exudative tonsils   Neck - palpable, non-tender L tonsillar node   Ears - L TM is retracted, dull and irregular   Lungs - clear  Rapid Strep: NEGATIVE    Imp: Recurrent sore throat   Acute Exudative Tonsillitis   Serous otitis L   Allergic rhinitis   Hx Elevated Globulin level   Hx Elevated WBC    Plan: Throat culture sent to lab   Augmentin for 10 days   Nasacort AQ   Hydration   Referral to ENT   Labs to recheck WBC and Elevated Globulin (April 12 labs)  _____________________________  Expected course of current diagnosed problem(s) as well as expected progression and possible complications, and desired follow up with provider are discussed with patient. Patient is encouraged to be back in touch with any questions or concerns. Patient expresses understanding of plan of care. Patient is given AVS which includes diagnoses, current medications, vitals.

## 2018-06-01 LAB
ALBUMIN SERPL-MCNC: 4.7 G/DL (ref 3.5–5.5)
ALBUMIN/GLOB SERPL: 1.7 {RATIO} (ref 1.2–2.2)
ALP SERPL-CCNC: 73 IU/L (ref 39–117)
ALT SERPL-CCNC: 13 IU/L (ref 0–44)
AST SERPL-CCNC: 15 IU/L (ref 0–40)
BASOPHILS # BLD AUTO: 0 X10E3/UL (ref 0–0.2)
BASOPHILS NFR BLD AUTO: 0 %
BILIRUB SERPL-MCNC: 0.4 MG/DL (ref 0–1.2)
BUN SERPL-MCNC: 11 MG/DL (ref 6–20)
BUN/CREAT SERPL: 13 (ref 9–20)
CALCIUM SERPL-MCNC: 10 MG/DL (ref 8.7–10.2)
CHLORIDE SERPL-SCNC: 98 MMOL/L (ref 96–106)
CO2 SERPL-SCNC: 28 MMOL/L (ref 18–29)
CREAT SERPL-MCNC: 0.83 MG/DL (ref 0.76–1.27)
EOSINOPHIL # BLD AUTO: 0.1 X10E3/UL (ref 0–0.4)
EOSINOPHIL NFR BLD AUTO: 2 %
ERYTHROCYTE [DISTWIDTH] IN BLOOD BY AUTOMATED COUNT: 14.5 % (ref 12.3–15.4)
GFR SERPLBLD CREATININE-BSD FMLA CKD-EPI: 128 ML/MIN/1.73
GFR SERPLBLD CREATININE-BSD FMLA CKD-EPI: 147 ML/MIN/1.73
GLOBULIN SER CALC-MCNC: 2.8 G/DL (ref 1.5–4.5)
GLUCOSE SERPL-MCNC: 79 MG/DL (ref 65–99)
HCT VFR BLD AUTO: 42.3 % (ref 37.5–51)
HGB BLD-MCNC: 14.1 G/DL (ref 13–17.7)
IMM GRANULOCYTES # BLD: 0 X10E3/UL (ref 0–0.1)
IMM GRANULOCYTES NFR BLD: 0 %
LYMPHOCYTES # BLD AUTO: 1.7 X10E3/UL (ref 0.7–3.1)
LYMPHOCYTES NFR BLD AUTO: 20 %
MCH RBC QN AUTO: 28.9 PG (ref 26.6–33)
MCHC RBC AUTO-ENTMCNC: 33.3 G/DL (ref 31.5–35.7)
MCV RBC AUTO: 87 FL (ref 79–97)
MONOCYTES # BLD AUTO: 1.5 X10E3/UL (ref 0.1–0.9)
MONOCYTES NFR BLD AUTO: 18 %
NEUTROPHILS # BLD AUTO: 5 X10E3/UL (ref 1.4–7)
NEUTROPHILS NFR BLD AUTO: 60 %
PLATELET # BLD AUTO: 231 X10E3/UL (ref 150–379)
POTASSIUM SERPL-SCNC: 4.5 MMOL/L (ref 3.5–5.2)
PROT SERPL-MCNC: 7.5 G/DL (ref 6–8.5)
RBC # BLD AUTO: 4.88 X10E6/UL (ref 4.14–5.8)
SODIUM SERPL-SCNC: 140 MMOL/L (ref 134–144)
WBC # BLD AUTO: 8.3 X10E3/UL (ref 3.4–10.8)

## 2018-06-03 LAB — BACTERIA SPEC RESP CULT: NORMAL

## 2018-07-09 ENCOUNTER — OFFICE VISIT (OUTPATIENT)
Dept: INTERNAL MEDICINE CLINIC | Age: 18
End: 2018-07-09

## 2018-07-09 VITALS
SYSTOLIC BLOOD PRESSURE: 96 MMHG | HEIGHT: 71 IN | DIASTOLIC BLOOD PRESSURE: 60 MMHG | OXYGEN SATURATION: 97 % | WEIGHT: 170 LBS | BODY MASS INDEX: 23.8 KG/M2 | HEART RATE: 94 BPM | TEMPERATURE: 98.9 F | RESPIRATION RATE: 14 BRPM

## 2018-07-09 DIAGNOSIS — J03.91 RECURRENT TONSILLITIS: Primary | ICD-10-CM

## 2018-07-09 RX ORDER — CEFUROXIME AXETIL 250 MG/1
250 TABLET ORAL 2 TIMES DAILY
Qty: 20 TAB | Refills: 0 | Status: SHIPPED | OUTPATIENT
Start: 2018-07-09 | End: 2018-07-19

## 2018-07-09 NOTE — PROGRESS NOTES
Subjective:      Debra Joe is a 21 y.o. male who presents today for evaluation of sore throat    04/2018 diagnosed with mono    Seen in 05/2018 and diagnosed with tonsillitis. Treated with Augmentin 10 day course. 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 20 Tab 0        Review of Systems    Pertinent items are noted in HPI.      Objective:     Visit Vitals    BP 96/60 (BP 1 Location: Left arm, BP Patient Position: Sitting)    Pulse 94    Temp 98.9 °F (37.2 °C) (Oral)    Resp 14    Ht 5' 11\" (1.803 m)    Wt 170 lb (77.1 kg)    SpO2 97%    BMI 23.71 kg/m2     General appearance: alert, cooperative, no distress, appears stated age  Head: Normocephalic, without obvious abnormality, atraumatic  Eyes: conjunctivae/corneas clear  Throat: tonsils 3+ and erythematous bilaterally, + pharyngeal erythema, no exudate  Neck: supple, symmetrical, trachea midline, mild cervical adenopathy, thyroid: not enlarged, symmetric, no tenderness/mass/nodules  Lungs: clear to auscultation bilaterally  Heart: regular rate and rhythm, S1, S2 normal, no murmur, click, rub or gallop  Skin: Skin color, texture, turgor normal  Lymph nodes: mild cervical lymphadenopathy  Neurologic: Grossly normal  Psych: calm, cooperative, appropriate affect    Assessment/Plan:     1. Recurrent tonsillitis  - has ENT appointment scheduled for 7/28, keep this  - water, rest, alternate ibuprofen/tylenol  - cefUROXime (CEFTIN) 250 mg tablet; Take 1 Tab by mouth two (2) times a day for 10 days. Dispense: 20 Tab; Refill: 0  - GREGORIO BARR VIRUS AB PANEL  - MONONUCLEOSIS SCREEN  - CBC WITH AUTOMATED DIFF      Advised him to call back or return to office if symptoms worsen/change/persist.  Discussed expected course/resolution/complications of diagnosis in detail with patient. Medication risks/benefits/costs/interactions/alternatives discussed with patient. He was given an after visit summary which includes diagnoses, current medications, & vitals. He expressed understanding with the diagnosis and plan.     ARNOL Interiano

## 2018-07-09 NOTE — MR AVS SNAPSHOT
[address] [place] [address], [address] [place] [address] 
[phone] Patient: Anna Camaar MRN: [id number] :2000 Visit Information Date & Time Provider Department Dept. Phone Encounter #  
 2018  4:20 PM [facility] [phone] Follow-up Instructions Return if symptoms worsen or fail to improve. Upcoming Health Maintenance Date Due  
 HPV Age 9Y-34Y (2 of 1 - Male 3 Dose Series) 5/3/2017 Pneumococcal 19-64 Highest Risk (2 of 3 - PPSV23) 2017 Influenza Age 5 to Adult 2018 DTaP/Tdap/Td series (7 - Td) 2020 Allergies as of 2018  Review Complete On: 2018 By: Ricardo Garsia LPN No Known Allergies Current Immunizations  Never Reviewed Name Date DTaP 2003, 2000, 1999, 3/8/1999, 1999 HPV 3/8/2017 Hep A Vaccine 2011, 2010 Hep B Vaccine 10/19/1999, 1999, 1998 Hib 3/2/2000, 1999, 3/8/1999, 1999 IPV 2003, 2000, 3/8/1999, 1999 Influenza Vaccine 2013, 2012 MMR 2003, 3/2/2000 Meningococcal (MCV4) Vaccine 2016, 2010 Pneumococcal Conjugate (PCV-13) 2007 Tdap 2010 Varicella Virus Vaccine 2010, 2000 Not reviewed this visit You Were Diagnosed With   
  
 Codes Comments Tonsillitis    -  Primary ICD-10-CM: J03.90 ICD-9-CM: 661 Vitals BP Pulse Temp Resp Height(growth percentile) 96/60 (<1 %/ 6 %)* (BP 1 Location: Left arm, BP Patient Position: Sitting) 94 98.9 °F (37.2 °C) (Oral) 14 5' 11\" (1.803 m) (69 %, Z= 0.50) Weight(growth percentile) SpO2 BMI Smoking Status 170 lb (77.1 kg) (71 %, Z= 0.57) 97% 23.71 kg/m2 (61 %, Z= 0.27) Never Smoker *BP percentiles are based on NHBPEP's 4th Report Growth percentiles are based on CDC 2-20 Years data. Vitals History BMI and BSA Data Body Mass Index Body Surface Area  
 23.71 kg/m 2 1.97 m 2 Preferred Pharmacy Pharmacy Name Phone [facility] #[id number]- [place], [address] [address] [phone] Your Updated Medication List  
  
   
This list is accurate as of 7/9/18  5:09 PM.  Always use your most recent med list.  
  
  
  
  
 cefUROXime 250 mg tablet Commonly known as:  CEFTIN Take 1 Tab by mouth two (2) times a day for 10 days. Prescriptions Sent to Pharmacy Refills  
 cefUROXime (CEFTIN) 250 mg tablet 0 Sig: Take 1 Tab by mouth two (2) times a day for 10 days. Class: Normal  
 Pharmacy: [facility]/pharmacy #[id number]- [place], [address] #: [phone] Route: Oral  
  
We Performed the Following CBC WITH AUTOMATED DIFF [05293 CPT(R)] [facility] PANEL [id number] CPT(R)] MONONUCLEOSIS SCREEN [id number] CPT(R)] Follow-up Instructions Return if symptoms worsen or fail to improve. Patient Instructions Cefuroxime 250mg tabs, 1 pill 2x/day for 10 days Introducing [facility]! Dear Gloria Espinal: Thank you for requesting a [employer] account. Our records indicate that you already have an active [employer] account. You can access your account anytime at [email or web address] Did you know that you can access your hospital and ER discharge instructions at any time in [employer]? You can also review all of your test results from your hospital stay or ER visit. Additional Information If you have questions, please visit the Frequently Asked Questions section of the [employer] website at [email or web address] Remember, [employer] is NOT to be used for urgent needs. For medical emergencies, dial 911. Now available from your iPhone and Android! Please provide this summary of care documentation to your next provider. Your primary care clinician is listed as Raul Rojas. If you have any questions after today's visit, please call [phone].

## 2018-07-12 LAB
BASOPHILS # BLD AUTO: 0 X10E3/UL (ref 0–0.2)
BASOPHILS NFR BLD AUTO: 0 %
EBV EA IGG SER-ACNC: <9 U/ML (ref 0–8.9)
EBV NA IGG SER IA-ACNC: 113 U/ML (ref 0–17.9)
EBV VCA IGG SER IA-ACNC: 94.7 U/ML (ref 0–17.9)
EBV VCA IGM SER IA-ACNC: <36 U/ML (ref 0–35.9)
EOSINOPHIL # BLD AUTO: 0.2 X10E3/UL (ref 0–0.4)
EOSINOPHIL NFR BLD AUTO: 3 %
ERYTHROCYTE [DISTWIDTH] IN BLOOD BY AUTOMATED COUNT: 13.8 % (ref 12.3–15.4)
HCT VFR BLD AUTO: 43.6 % (ref 37.5–51)
HETEROPH AB SER QL LA: NEGATIVE
HGB BLD-MCNC: 14.4 G/DL (ref 13–17.7)
IMM GRANULOCYTES # BLD: 0 X10E3/UL (ref 0–0.1)
IMM GRANULOCYTES NFR BLD: 0 %
LYMPHOCYTES # BLD AUTO: 1.7 X10E3/UL (ref 0.7–3.1)
LYMPHOCYTES NFR BLD AUTO: 22 %
MCH RBC QN AUTO: 29 PG (ref 26.6–33)
MCHC RBC AUTO-ENTMCNC: 33 G/DL (ref 31.5–35.7)
MCV RBC AUTO: 88 FL (ref 79–97)
MONOCYTES # BLD AUTO: 1.2 X10E3/UL (ref 0.1–0.9)
MONOCYTES NFR BLD AUTO: 15 %
NEUTROPHILS # BLD AUTO: 4.8 X10E3/UL (ref 1.4–7)
NEUTROPHILS NFR BLD AUTO: 60 %
PLATELET # BLD AUTO: 259 X10E3/UL (ref 150–379)
RBC # BLD AUTO: 4.96 X10E6/UL (ref 4.14–5.8)
SERVICE CMNT-IMP: ABNORMAL
WBC # BLD AUTO: 8 X10E3/UL (ref 3.4–10.8)

## 2018-09-21 ENCOUNTER — OFFICE VISIT (OUTPATIENT)
Dept: INTERNAL MEDICINE CLINIC | Age: 18
End: 2018-09-21

## 2018-09-21 VITALS
SYSTOLIC BLOOD PRESSURE: 108 MMHG | TEMPERATURE: 97 F | DIASTOLIC BLOOD PRESSURE: 58 MMHG | HEART RATE: 61 BPM | BODY MASS INDEX: 23.46 KG/M2 | HEIGHT: 71 IN | OXYGEN SATURATION: 98 % | RESPIRATION RATE: 16 BRPM | WEIGHT: 167.6 LBS

## 2018-09-21 DIAGNOSIS — Z23 ENCOUNTER FOR IMMUNIZATION: ICD-10-CM

## 2018-09-21 DIAGNOSIS — Z86.19 HISTORY OF MONONUCLEOSIS: ICD-10-CM

## 2018-09-21 DIAGNOSIS — R10.9 FLANK PAIN: Primary | ICD-10-CM

## 2018-09-21 NOTE — PROGRESS NOTES
Klaudia Renee is a 21 y.o. male    Chief Complaint   Patient presents with    Side Pain     right sided, below ribs x 1 week. States the pain comes and goes but has been happening everyday. 1. Have you been to the ER, urgent care clinic since your last visit? Hospitalized since your last visit? No    2. Have you seen or consulted any other health care providers outside of the [address] [place] [address] since your last visit? Include any pap smears or colon screening. No     Visit Vitals    /58 (BP 1 Location: Left arm, BP Patient Position: Sitting)    Pulse 61    Temp 97 °F (36.1 °C) (Oral)    Resp 16    Ht 5' 11\" (1.803 m)    Wt 167 lb 9.6 oz (76 kg)    SpO2 98%    BMI 23.38 kg/m2     Health Maintenance Due   Topic Date Due    HPV Age 9Y-34Y (2 of 1 - Male 3 Dose Series) 05/03/2017    Pneumococcal 19-64 Highest Risk (2 of 3 - PPSV23) 11/18/2017    Influenza Age 5 to Adult  08/01/2018     Administered 0.5 ml of influenza vaccine per VO into right deltoid. Patient tolerated injection well with no adverse effects noted.     Jaci Leija LPN

## 2018-09-21 NOTE — PROGRESS NOTES
Subjective:      Darío Malone is a 21 y.o. male who presents today for evaluation of flank pain    Reporting about 1 month of intermittent right flank/side pain  Describes as a throbbing dull pain x 1 hour once a day. Occurs daily. No changes in bowels, nausea or vomiting. No fever, chillls, dysuria, or changes in urine. No other abdominal pain  No changes in appetite or weight loss    Has recent history of mononucleosis and spleenomegally. Is concerned may be realted    Recurrent tonsillitis: is scheduling for tonsillectomy in the spring or summer, following with [facility]. Has not been ill since our last visit    Works out at [employer] regularly. Does not seem to bring on his flank pain. 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 Objective:     Visit Vitals    /58 (BP 1 Location: Left arm, BP Patient Position: Sitting)    Pulse 61    Temp 97 °F (36.1 °C) (Oral)    Resp 16    Ht 5' 11\" (1.803 m)    Wt 167 lb 9.6 oz (76 kg)    SpO2 98%    BMI 23.38 kg/m2     General appearance: alert, cooperative, no distress, appears stated age  Head: Normocephalic, without obvious abnormality, atraumatic  Lungs: clear to auscultation bilaterally  Heart: regular rate and rhythm, S1, S2 normal, no murmur, click, rub or gallop  Abdomen: muscular, soft, non-tender. Bowel sounds normal. No masses,  no organomegaly  Extremities: extremities normal, atraumatic, no cyanosis or edema  Skin: Skin color, texture, turgor normal.  Neurologic: Grossly normal  Psych: calm, cooperative, appropriate affect    Assessment/Plan:     1.  Flank pain  - normal exam  - possibly from exercise and weight lifting  - US ABD COMP; Future  - CBC WITH AUTOMATED DIFF  - METABOLIC PANEL, COMPREHENSIVE  - UA/M W/RFLX CULTURE, ROUTINE    2. History of mononucleosis  - reassurance  - US ABD COMP; Future    3. Encounter for immunization  - Influenza virus vaccine (QUADRIVALENT PRES FREE SYRINGE) IM ([id number])  - AL IMMUNIZ ADMIN,1 SINGLE/COMB VAC/TOXOID      Advised him to call back or return to office if symptoms worsen/change/persist.  Discussed expected course/resolution/complications of diagnosis in detail with patient. Medication risks/benefits/costs/interactions/alternatives discussed with patient. He was given an after visit summary which includes diagnoses, current medications, & vitals. He expressed understanding with the diagnosis and plan.     ARNOL Wagoner

## 2018-09-21 NOTE — MR AVS SNAPSHOT
[facility], [address] [place] [address] 
[phone] Patient: Lana Sales MRN: [id number] :2000 Visit Information Date & Time Provider Department Dept. Phone Encounter #  
 2018 11:40 AM MANUEL Estrada [facility] [phone] [id number] Follow-up Instructions Return if symptoms worsen or fail to improve. Upcoming Health Maintenance Date Due  
 HPV Age 9Y-34Y (2 of 1 - Male 3 Dose Series) 5/3/2017 Pneumococcal 19-64 Highest Risk (2 of 3 - PPSV23) 2017 Influenza Age 5 to Adult 2018 DTaP/Tdap/Td series (7 - Td) 2020 Allergies as of 2018  Review Complete On: 2018 By: Missy Welch LPN No Known Allergies Current Immunizations  Never Reviewed Name Date DTaP 2003, 2000, 1999, 3/8/1999, 1999 HPV 3/8/2017 Hep A Vaccine 2011, 2010 Hep B Vaccine 10/19/1999, 1999, 1998 Hib 3/2/2000, 1999, 3/8/1999, 1999 IPV 2003, 2000, 3/8/1999, 1999 Influenza Vaccine 2013, 2012 Influenza Vaccine (Quad) PF  Incomplete MMR 2003, 3/2/2000 Meningococcal (MCV4) Vaccine 2016, 2010 Pneumococcal Conjugate (PCV-13) 2007 Tdap 2010 Varicella Virus Vaccine 2010, 2000 Not reviewed this visit You Were Diagnosed With   
  
 Codes Comments Flank pain    -  Primary ICD-10-CM: R10.9 ICD-9-CM: 789.09 History of mononucleosis     ICD-10-CM: Z86.19 ICD-9-CM: V12.09 Encounter for immunization     ICD-10-CM: T19 ICD-9-CM: V03.89 Vitals BP Pulse Temp Resp Height(growth percentile) 108/58 (7 %/ 3 %)* (BP 1 Location: Left arm, BP Patient Position: Sitting) 61 97 °F (36.1 °C) (Oral) 16 5' 11\" (1.803 m) (69 %, Z= 0.49) Weight(growth percentile) SpO2 BMI Smoking Status 167 lb 9.6 oz (76 kg) (68 %, Z= 0.46) 98% 23.38 kg/m2 (56 %, Z= 0.14) Never Smoker *BP percentiles are based on NHBPEP's 4th Report Growth percentiles are based on CDC 2-20 Years data. Vitals History BMI and BSA Data Body Mass Index Body Surface Area  
 23.38 kg/m 2 1.95 m 2 Preferred Pharmacy Pharmacy Name Phone [id number]- [place] Espino, [facility] [address] [phone] Your Updated Medication List  
  
Notice  As of 9/21/2018 12:14 PM  
 You have not been prescribed any medications. We Performed the Following CBC WITH AUTOMATED DIFF [86313 CPT(R)] INFLUENZA VIRUS VAC QUAD,SPLIT,PRESV FREE SYRINGE IM [id number] CPT(R)] METABOLIC PANEL, COMPREHENSIVE [83430 CPT(R)] MD IMMUNIZ ADMIN,1 SINGLE/COMB VAC/TOXOID [id number] CPT(R)] Follow-up Instructions Return if symptoms worsen or fail to improve. To-Do List   
 09/24/2018 Imaging:  US ABD COMP Patient Instructions Schedule your Abdominal Ultrasound ([phone]) Vaccine Information Statement Influenza (Flu) Vaccine (Inactivated or Recombinant): What you need to know Many Vaccine Information Statements are available in [place] and other languages. See www.immunize.org/vis Hojas de Información Sobre Vacunas están disponibles en Español y en muchos otros idiomas. Visite www.immunize.org/vis 1. Why get vaccinated? Influenza (flu) is a contagious disease that spreads around the [place] every year, usually between October and May. Flu is caused by influenza viruses, and is spread mainly by coughing, sneezing, and close contact. Anyone can get flu. Flu strikes suddenly and can last several days. Symptoms vary by age, but can include: 
 fever/chills  sore throat  muscle aches  fatigue  cough  headache  runny or stuffy nose Flu can also lead to pneumonia and blood infections, and cause diarrhea and seizures in children. If you have a medical condition, such as heart or lung disease, flu can make it worse. Flu is more dangerous for some people. Infants and young children, people 72years of age and older, pregnant women, and people with certain health conditions or a weakened immune system are at greatest risk. Each year thousands of people in the [facility] die from flu, and many more are hospitalized. Flu vaccine can: 
 keep you from getting flu, 
 make flu less severe if you do get it, and 
 keep you from spreading flu to your family and other people. 2. Inactivated and recombinant flu vaccines A dose of flu vaccine is recommended every flu season. Children 6 months through 6years of age may need two doses during the same flu season. Everyone else needs only one dose each flu season. Some inactivated flu vaccines contain a very small amount of a mercury-based preservative called thimerosal. Studies have not shown thimerosal in vaccines to be harmful, but flu vaccines that do not contain thimerosal are available. There is no live flu virus in flu shots. They cannot cause the flu. There are many flu viruses, and they are always changing. Each year a new flu vaccine is made to protect against three or four viruses that are likely to cause disease in the upcoming flu season. But even when the vaccine doesnt exactly match these viruses, it may still provide some protection Flu vaccine cannot prevent: 
 flu that is caused by a virus not covered by the vaccine, or 
 illnesses that look like flu but are not. It takes about 2 weeks for protection to develop after vaccination, and protection lasts through the flu season. 3. Some people should not get this vaccine Tell the person who is giving you the vaccine:  If you have any severe, life-threatening allergies. If you ever had a life-threatening allergic reaction after a dose of flu vaccine, or have a severe allergy to any part of this vaccine, you may be advised not to get vaccinated. Most, but not all, types of flu vaccine contain a small amount of egg protein.  If you ever had Guillain-Barré Syndrome (also called GBS). Some people with a history of GBS should not get this vaccine. This should be discussed with your doctor.  If you are not feeling well. It is usually okay to get flu vaccine when you have a mild illness, but you might be asked to come back when you feel better. 4. Risks of a vaccine reaction With any medicine, including vaccines, there is a chance of reactions. These are usually mild and go away on their own, but serious reactions are also possible. Most people who get a flu shot do not have any problems with it. Minor problems following a flu shot include:  
 soreness, redness, or swelling where the shot was given  hoarseness  sore, red or itchy eyes  cough  fever  aches  headache  itching  fatigue If these problems occur, they usually begin soon after the shot and last 1 or 2 days. More serious problems following a flu shot can include the following:  There may be a small increased risk of Guillain-Barré Syndrome (GBS) after inactivated flu vaccine. This risk has been estimated at 1 or 2 additional cases per million people vaccinated. This is much lower than the risk of severe complications from flu, which can be prevented by flu vaccine.  Young children who get the flu shot along with pneumococcal vaccine (PCV13) and/or DTaP vaccine at the same time might be slightly more likely to have a seizure caused by fever. Ask your doctor for more information. Tell your doctor if a child who is getting flu vaccine has ever had a seizure. Problems that could happen after any injected vaccine:  People sometimes faint after a medical procedure, including vaccination. Sitting or lying down for about 15 minutes can help prevent fainting, and injuries caused by a fall. Tell your doctor if you feel dizzy, or have vision changes or ringing in the ears.  Some people get severe pain in the shoulder and have difficulty moving the arm where a shot was given. This happens very rarely.  Any medication can cause a severe allergic reaction. Such reactions from a vaccine are very rare, estimated at about 1 in a million doses, and would happen within a few minutes to a few hours after the vaccination. As with any medicine, there is a very remote chance of a vaccine causing a serious injury or death. The safety of vaccines is always being monitored. For more information, visit: www.cdc.gov/vaccinesafety/ 
 
 
The [facility] Vaccine Injury Compensation Program (VICP) is a federal program that was created to compensate people who may have been injured by certain vaccines. Persons who believe they may have been injured by a vaccine can learn about the program and about filing a claim by calling 1-151.420.3911 or visiting the [address] [employer] website at www.[facility].gov/vaccinecompensation. There is a time limit to file a claim for compensation. 7. How can I learn more?  Ask your healthcare provider. He or she can give you the vaccine package insert or suggest other sources of information.  Call your local or state health department.  Contact the Centers for Disease Control and Prevention (CDC): 
- Call 3-484.607.5745 (1-800-CDC-INFO) or 
- Visit CDCs website at www.cdc.gov/flu Vaccine Information Statement Inactivated Influenza Vaccine 8/7/2015 
[address]. Thelda Cushing 89YR-21 [facility] of [facility] and [place] Centers for Disease Control and Prevention Office Use Only Introducing [facility]! Dear [facility]: Thank you for requesting a [employer] account. Our records indicate that you already have an active [employer] account. You can access your account anytime at [email or web address] Did you know that you can access your hospital and ER discharge instructions at any time in [employer]? You can also review all of your test results from your hospital stay or ER visit. Additional Information If you have questions, please visit the Frequently Asked Questions section of the [employer] website at [email or web address] Remember, [employer] is NOT to be used for urgent needs. For medical emergencies, dial 911. Now available from your iPhone and Android! Please provide this summary of care documentation to your next provider. Your primary care clinician is listed as Marisol Diaz. If you have any questions after today's visit, please call [phone].

## 2018-09-21 NOTE — PATIENT INSTRUCTIONS
Schedule your Abdominal Ultrasound ([phone])          Vaccine Information Statement    Influenza (Flu) Vaccine (Inactivated or Recombinant): What you need to know    Many Vaccine Information Statements are available in Green[place]ic and other languages. See www.immunize.org/vis  Hojas de Información Sobre Vacunas están disponibles en Español y en muchos otros idiomas. Visite www.immunize.org/vis    1. Why get vaccinated? Influenza (flu) is a contagious disease that spreads around the [facility] every year, usually between October and May. Flu is caused by influenza viruses, and is spread mainly by coughing, sneezing, and close contact. Anyone can get flu. Flu strikes suddenly and can last several days. Symptoms vary by age, but can include:   fever/chills   sore throat   muscle aches   fatigue   cough   headache    runny or stuffy nose    Flu can also lead to pneumonia and blood infections, and cause diarrhea and seizures in children. If you have a medical condition, such as heart or lung disease, flu can make it worse. Flu is more dangerous for some people. Infants and young children, people 72years of age and older, pregnant women, and people with certain health conditions or a weakened immune system are at greatest risk. Each year thousands of people in the [facility] die from flu, and many more are hospitalized. Flu vaccine can:   keep you from getting flu,   make flu less severe if you do get it, and   keep you from spreading flu to your family and other people. 2. Inactivated and recombinant flu vaccines    A dose of flu vaccine is recommended every flu season. Children 6 months through 6years of age may need two doses during the same flu season. Everyone else needs only one dose each flu season.        Some inactivated flu vaccines contain a very small amount of a mercury-based preservative called thimerosal. Studies have not shown thimerosal in vaccines to be harmful, but flu vaccines that do not contain thimerosal are available. There is no live flu virus in flu shots. They cannot cause the flu. There are many flu viruses, and they are always changing. Each year a new flu vaccine is made to protect against three or four viruses that are likely to cause disease in the upcoming flu season. But even when the vaccine doesnt exactly match these viruses, it may still provide some protection    Flu vaccine cannot prevent:   flu that is caused by a virus not covered by the vaccine, or   illnesses that look like flu but are not. It takes about 2 weeks for protection to develop after vaccination, and protection lasts through the flu season. 3. Some people should not get this vaccine    Tell the person who is giving you the vaccine:     If you have any severe, life-threatening allergies. If you ever had a life-threatening allergic reaction after a dose of flu vaccine, or have a severe allergy to any part of this vaccine, you may be advised not to get vaccinated. Most, but not all, types of flu vaccine contain a small amount of egg protein.  If you ever had Guillain-Barré Syndrome (also called GBS). Some people with a history of GBS should not get this vaccine. This should be discussed with your doctor.  If you are not feeling well. It is usually okay to get flu vaccine when you have a mild illness, but you might be asked to come back when you feel better. 4. Risks of a vaccine reaction    With any medicine, including vaccines, there is a chance of reactions. These are usually mild and go away on their own, but serious reactions are also possible. Most people who get a flu shot do not have any problems with it.      Minor problems following a flu shot include:    soreness, redness, or swelling where the shot was given     hoarseness   sore, red or itchy eyes   cough   fever   aches   headache   itching   fatigue  If these problems occur, they usually begin soon after the shot and last 1 or 2 days. More serious problems following a flu shot can include the following:     There may be a small increased risk of Guillain-Barré Syndrome (GBS) after inactivated flu vaccine. This risk has been estimated at 1 or 2 additional cases per million people vaccinated. This is much lower than the risk of severe complications from flu, which can be prevented by flu vaccine.  Young children who get the flu shot along with pneumococcal vaccine (PCV13) and/or DTaP vaccine at the same time might be slightly more likely to have a seizure caused by fever. Ask your doctor for more information. Tell your doctor if a child who is getting flu vaccine has ever had a seizure. Problems that could happen after any injected vaccine:      People sometimes faint after a medical procedure, including vaccination. Sitting or lying down for about 15 minutes can help prevent fainting, and injuries caused by a fall. Tell your doctor if you feel dizzy, or have vision changes or ringing in the ears.  Some people get severe pain in the shoulder and have difficulty moving the arm where a shot was given. This happens very rarely.  Any medication can cause a severe allergic reaction. Such reactions from a vaccine are very rare, estimated at about 1 in a million doses, and would happen within a few minutes to a few hours after the vaccination. As with any medicine, there is a very remote chance of a vaccine causing a serious injury or death. The safety of vaccines is always being monitored. For more information, visit: www.cdc.gov/vaccinesafety/    5. What if there is a serious reaction? What should I look for?  Look for anything that concerns you, such as signs of a severe allergic reaction, very high fever, or unusual behavior.     Signs of a severe allergic reaction can include hives, swelling of the face and throat, difficulty breathing, a fast heartbeat, dizziness, and weakness - usually within a few minutes to a few hours after the vaccination. What should I do?  If you think it is a severe allergic reaction or other emergency that cant wait, call 9-1-1 and get the person to the nearest hospital. Otherwise, call your doctor.  Reactions should be reported to the Vaccine Adverse Event Reporting System (VAERS). Your doctor should file this report, or you can do it yourself through  the VAERS web site at www.vaers. [facility].gov, or by calling 7-824.103.2371. VAERS does not give medical advice. 6. The National Vaccine Injury Compensation Program    The [facility] Vaccine Injury Compensation Program (VICP) is a federal program that was created to compensate people who may have been injured by certain vaccines. Persons who believe they may have been injured by a vaccine can learn about the program and about filing a claim by calling 5-552.832.9222 or visiting the [facility] website at www.[facility].gov/vaccinecompensation. There is a time limit to file a claim for compensation. 7. How can I learn more?  Ask your healthcare provider. He or she can give you the vaccine package insert or suggest other sources of information.  Call your local or state health department.  Contact the Centers for Disease Control and Prevention (CDC):  - Call 1-842.943.9425 (2-636-WQY-INFO) or  - Visit CDCs website at www.cdc.gov/flu    Vaccine Information Statement   Inactivated Influenza Vaccine   8/7/2015  42 YANDY Mir [id number]-[id number]    Department of Health and Human Services  Centers for Disease Control and Prevention    Office Use Only

## 2018-10-01 ENCOUNTER — HOSPITAL ENCOUNTER (OUTPATIENT)
Dept: ULTRASOUND IMAGING | Age: 18
Discharge: HOME | End: 2018-10-01
Payer: COMMERCIAL

## 2018-10-01 DIAGNOSIS — Z86.19 HISTORY OF MONONUCLEOSIS: ICD-10-CM

## 2018-10-01 DIAGNOSIS — R10.9 FLANK PAIN: ICD-10-CM

## 2018-10-01 PROCEDURE — 76700 US EXAM ABDOM COMPLETE: CPT

## 2018-11-20 ENCOUNTER — HOSPITAL ENCOUNTER (OUTPATIENT)
Dept: GENERAL RADIOLOGY | Age: 18
Discharge: HOME | End: 2018-11-20
Payer: COMMERCIAL

## 2018-11-20 ENCOUNTER — OFFICE VISIT (OUTPATIENT)
Dept: INTERNAL MEDICINE CLINIC | Age: 18
End: 2018-11-20

## 2018-11-20 VITALS
OXYGEN SATURATION: 98 % | WEIGHT: 171 LBS | HEIGHT: 71 IN | DIASTOLIC BLOOD PRESSURE: 80 MMHG | HEART RATE: 76 BPM | BODY MASS INDEX: 23.94 KG/M2 | RESPIRATION RATE: 14 BRPM | TEMPERATURE: 98.7 F | SYSTOLIC BLOOD PRESSURE: 110 MMHG

## 2018-11-20 DIAGNOSIS — M25.531 RIGHT WRIST PAIN: ICD-10-CM

## 2018-11-20 DIAGNOSIS — M25.531 RIGHT WRIST PAIN: Primary | ICD-10-CM

## 2018-11-20 PROCEDURE — 73110 X-RAY EXAM OF WRIST: CPT

## 2018-11-20 NOTE — PROGRESS NOTES
HISTORY OF PRESENT ILLNESS  Marry Real is a 19 y.o. male. Patient reports right wrist pain x 3 weeks. It started after boxing and lifting weights. He has not stopped these activities. The pain has not improved. He does get relief from Motrin. His wrist was never swollen or bruised. No loss of strength. No splint has been applied. Visit Vitals  /80 (BP 1 Location: Left arm, BP Patient Position: Sitting)   Pulse 76   Temp 98.7 °F (37.1 °C) (Oral)   Resp 14   Ht 5' 11\" (1.803 m)   Wt 171 lb (77.6 kg)   SpO2 98%   BMI 23.85 kg/m²       HPI    Review of Systems   Musculoskeletal:        Right wrist pain       Physical Exam   Constitutional: He is oriented to person, place, and time. He appears well-developed and well-nourished. Musculoskeletal:        Right wrist: He exhibits tenderness (right inner carpal region of dorsal side of right wrist; worse with dorsiflexion of wrist; pain does not radiate). He exhibits normal range of motion, no swelling and no crepitus. Neurological: He is alert and oriented to person, place, and time. Skin: Skin is warm and dry. Psychiatric: He has a normal mood and affect. ASSESSMENT and PLAN    ICD-10-CM ICD-9-CM    1.  Right wrist pain M25.531 719.43 XR WRIST RT AP/LAT     Orders Placed This Encounter    XR WRIST RT AP/LAT   right wrist splint for 4 weeks  Avoid pain-provoking activities  OTC motrin for pain relief  Xray ordered

## 2018-11-20 NOTE — PROGRESS NOTES
Patient notified xray of wrist was normal. No fracture . Wear splint as advised for at least 4 weeks.

## 2018-11-20 NOTE — PROGRESS NOTES
Chief Complaint   Patient presents with    Hand Pain     x 3 wks     Reviewed record in preparation for visit and have obtained necessary documentation. Identified pt with two pt identifiers(name and ). Health Maintenance Due   Topic    HPV Age 9Y-34Y (2 - Male 3-dose series)    Pneumococcal 19-64 Highest Risk (2 of 3 - PPSV23)         Chief Complaint   Patient presents with    Hand Pain     x 3 wks        Wt Readings from Last 3 Encounters:   18 171 lb (77.6 kg)   18 167 lb 9.6 oz (76 kg) (68 %, Z= 0.46)*   18 170 lb (77.1 kg) (71 %, Z= 0.57)*     * Growth percentiles are based on CDC (Boys, 2-20 Years) data. Temp Readings from Last 3 Encounters:   18 98.7 °F (37.1 °C) (Oral)   18 97 °F (36.1 °C) (Oral)   18 98.9 °F (37.2 °C) (Oral)     BP Readings from Last 3 Encounters:   18 110/80   18 108/58 (7 %, Z = -1.48 /  9 %, Z = -1.36)*   18 96/60 (<1 %, Z < -2.33 /  11 %, Z = -1.23)*     *BP percentiles are based on the 2017 AAP Clinical Practice Guideline for boys     Pulse Readings from Last 3 Encounters:   18 76   18 61   18 94           Learning Assessment:  :     Learning Assessment 2018   PRIMARY LEARNER Patient   HIGHEST LEVEL OF EDUCATION - PRIMARY LEARNER  GRADUATED HIGH SCHOOL OR GED   BARRIERS PRIMARY LEARNER NONE   CO-LEARNER CAREGIVER No   PRIMARY LANGUAGE ENGLISH   LEARNER PREFERENCE PRIMARY DEMONSTRATION   ANSWERED BY patient   RELATIONSHIP SELF       Depression Screening:  :     PHQ over the last two weeks 2018   Little interest or pleasure in doing things Not at all   Feeling down, depressed, irritable, or hopeless Not at all   Total Score PHQ 2 0       Fall Risk Assessment:  :     No flowsheet data found. Abuse Screening:  :     Abuse Screening Questionnaire 2018   Do you ever feel afraid of your partner? N   Are you in a relationship with someone who physically or mentally threatens you?  Demar Valenzuela Is it safe for you to go home? Y       Coordination of Care Questionnaire:  :     1) Have you been to an emergency room, urgent care clinic since your last visit? no   Hospitalized since your last visit? no             2) Have you seen or consulted any other health care providers outside of [address] [place] [address] since your last visit? no  (Include any pap smears or colon screenings in this section.)    3) Do you have an Advance Directive on file? no    4) Are you interested in receiving information on Advance Directives? NO      Patient is accompanied by self I have received verbal consent from Stacy Whitt to discuss any/all medical information while they are present in the room. Reviewed record  In preparation for visit and have obtained necessary documentation.

## 2019-01-15 ENCOUNTER — OFFICE VISIT (OUTPATIENT)
Dept: INTERNAL MEDICINE CLINIC | Age: 19
End: 2019-01-15

## 2019-01-15 VITALS
HEIGHT: 71 IN | OXYGEN SATURATION: 98 % | HEART RATE: 101 BPM | BODY MASS INDEX: 23.81 KG/M2 | WEIGHT: 170.1 LBS | SYSTOLIC BLOOD PRESSURE: 110 MMHG | DIASTOLIC BLOOD PRESSURE: 70 MMHG | TEMPERATURE: 101.7 F | RESPIRATION RATE: 14 BRPM

## 2019-01-15 DIAGNOSIS — J02.9 SORE THROAT: ICD-10-CM

## 2019-01-15 DIAGNOSIS — J03.90 TONSILLITIS: Primary | ICD-10-CM

## 2019-01-15 DIAGNOSIS — R50.9 FEVER, UNSPECIFIED FEVER CAUSE: ICD-10-CM

## 2019-01-15 LAB
FLUAV+FLUBV AG NOSE QL IA.RAPID: NEGATIVE POS/NEG
FLUAV+FLUBV AG NOSE QL IA.RAPID: NEGATIVE POS/NEG
S PYO AG THROAT QL: NEGATIVE
VALID INTERNAL CONTROL?: YES
VALID INTERNAL CONTROL?: YES

## 2019-01-15 NOTE — PROGRESS NOTES
Chief Complaint   Patient presents with    Generalized Body Aches    Chills     Reviewed record in preparation for visit and have obtained necessary documentation. Identified pt with two pt identifiers(name and ). Health Maintenance Due   Topic    HPV Age 9Y-34Y (2 - Male 3-dose series)    Pneumococcal 19-64 Medium Risk (1 of 1 - PPSV23)         Chief Complaint   Patient presents with    Generalized Body Aches    Chills        Wt Readings from Last 3 Encounters:   01/15/19 170 lb 1.6 oz (77.2 kg)   18 171 lb (77.6 kg)   18 167 lb 9.6 oz (76 kg) (68 %, Z= 0.46)*     * Growth percentiles are based on CDC (Boys, 2-20 Years) data. Temp Readings from Last 3 Encounters:   01/15/19 (!) 101.7 °F (38.7 °C) (Oral)   18 98.7 °F (37.1 °C) (Oral)   18 97 °F (36.1 °C) (Oral)     BP Readings from Last 3 Encounters:   01/15/19 110/70   18 110/80   18 108/58 (7 %, Z = -1.48 /  9 %, Z = -1.36)*     *BP percentiles are based on the 2017 AAP Clinical Practice Guideline for boys     Pulse Readings from Last 3 Encounters:   01/15/19 (!) 101   18 76   18 61           Learning Assessment:  :     Learning Assessment 2018   PRIMARY LEARNER Patient   HIGHEST LEVEL OF EDUCATION - PRIMARY LEARNER  GRADUATED HIGH SCHOOL OR GED   BARRIERS PRIMARY LEARNER NONE   CO-LEARNER CAREGIVER No   PRIMARY LANGUAGE ENGLISH   LEARNER PREFERENCE PRIMARY DEMONSTRATION   ANSWERED BY patient   RELATIONSHIP SELF       Depression Screening:  :     PHQ over the last two weeks 1/15/2019   Little interest or pleasure in doing things Not at all   Feeling down, depressed, irritable, or hopeless Not at all   Total Score PHQ 2 0       Fall Risk Assessment:  :     No flowsheet data found. Abuse Screening:  :     Abuse Screening Questionnaire 2018   Do you ever feel afraid of your partner? N   Are you in a relationship with someone who physically or mentally threatens you?  N   Is it safe for you to go home? Y       Coordination of Care Questionnaire:  :     1) Have you been to an emergency room, urgent care clinic since your last visit? no   Hospitalized since your last visit? no             2) Have you seen or consulted any other health care providers outside of [address] [place] [address] since your last visit? no  (Include any pap smears or colon screenings in this section.)    3) Do you have an Advance Directive on file? no    4) Are you interested in receiving information on Advance Directives? NO      Patient is accompanied by self I have received verbal consent from Don Ordoñez to discuss any/all medical information while they are present in the room. Reviewed record  In preparation for visit and have obtained necessary documentation. Reviewed record in preparation for visit and have obtained necessary documentation. Identified pt with two pt identifiers(name and ). Health Maintenance Due   Topic    HPV Age 9Y-34Y (2 - Male 3-dose series)    Pneumococcal 19-64 Medium Risk (1 of 1 - PPSV23)         Chief Complaint   Patient presents with    Generalized Body Aches    Chills        Wt Readings from Last 3 Encounters:   01/15/19 170 lb 1.6 oz (77.2 kg)   18 171 lb (77.6 kg)   18 167 lb 9.6 oz (76 kg) (68 %, Z= 0.46)*     * Growth percentiles are based on CDC (Boys, 2-20 Years) data.      Temp Readings from Last 3 Encounters:   01/15/19 (!) 101.7 °F (38.7 °C) (Oral)   18 98.7 °F (37.1 °C) (Oral)   18 97 °F (36.1 °C) (Oral)     BP Readings from Last 3 Encounters:   01/15/19 110/70   18 110/80   18 108/58 (7 %, Z = -1.48 /  9 %, Z = -1.36)*     *BP percentiles are based on the 2017 AAP Clinical Practice Guideline for boys     Pulse Readings from Last 3 Encounters:   01/15/19 (!) 101   18 76   18 61           Learning Assessment:  :     Learning Assessment 2018   PRIMARY LEARNER Patient   HIGHEST LEVEL OF EDUCATION - PRIMARY LEARNER  GRADUATED HIGH SCHOOL OR GED   BARRIERS PRIMARY LEARNER NONE   CO-LEARNER CAREGIVER No   PRIMARY LANGUAGE ENGLISH   LEARNER PREFERENCE PRIMARY DEMONSTRATION   ANSWERED BY patient   RELATIONSHIP SELF       Depression Screening:  :     PHQ over the last two weeks 9/21/2018   Little interest or pleasure in doing things Not at all   Feeling down, depressed, irritable, or hopeless Not at all   Total Score PHQ 2 0       Fall Risk Assessment:  :     No flowsheet data found. Abuse Screening:  :     Abuse Screening Questionnaire 5/1/2018   Do you ever feel afraid of your partner? N   Are you in a relationship with someone who physically or mentally threatens you? N   Is it safe for you to go home? Y       Coordination of Care Questionnaire:  :     1) Have you been to an emergency room, urgent care clinic since your last visit? no   Hospitalized since your last visit? no             2) Have you seen or consulted any other health care providers outside of [address] [place] [address] since your last visit? no  (Include any pap smears or colon screenings in this section.)    3) Do you have an Advance Directive on file? no    4) Are you interested in receiving information on Advance Directives? NO      Patient is accompanied by self I have received verbal consent from Horacio Anderson to discuss any/all medical information while they are present in the room. Reviewed record  In preparation for visit and have obtained necessary documentation.

## 2019-01-15 NOTE — PROGRESS NOTES
HISTORY OF PRESENT ILLNESS  Robert Tom is a 19 y.o. male. Patient reports severe sore throat x 4 days with fever, chills, cough, body aches, and congestion. He has taken motrin and 2 doses of amoxicillin 875 mg with no relief. He did get his flu shot this season. Patient has history of mono and recurrent tonsillitis. Visit Vitals  /70 (BP 1 Location: Left arm, BP Patient Position: Sitting)   Pulse (!) 101   Temp (!) 101.7 °F (38.7 °C) (Oral)   Resp 14   Ht 5' 11\" (1.803 m)   Wt 170 lb 1.6 oz (77.2 kg)   SpO2 98%   BMI 23.72 kg/m²       HPI    Review of Systems   Constitutional: Positive for chills, fever and malaise/fatigue. HENT: Positive for congestion and sore throat. Respiratory: Positive for cough. Musculoskeletal: Positive for myalgias. Physical Exam   Constitutional: He is oriented to person, place, and time. He appears well-developed and well-nourished. HENT:   Head: Normocephalic. Right Ear: Hearing, tympanic membrane, external ear and ear canal normal.   Left Ear: Hearing, tympanic membrane, external ear and ear canal normal.   Nose: Mucosal edema and rhinorrhea present. Mouth/Throat: Uvula is midline and mucous membranes are normal. Oropharyngeal exudate, posterior oropharyngeal edema and posterior oropharyngeal erythema present. Neck: Normal range of motion. Neck supple. Cardiovascular: Normal rate, regular rhythm and normal heart sounds. Pulmonary/Chest: Effort normal and breath sounds normal.   Lymphadenopathy:     He has cervical adenopathy. Neurological: He is alert and oriented to person, place, and time. Skin: Skin is warm and dry. Psychiatric: He has a normal mood and affect.    tonsils 2-3+ bilaterally  Results for orders placed or performed in visit on 01/15/19   AMB POC OBIE INFLUENZA A/B TEST   Result Value Ref Range    VALID INTERNAL CONTROL POC Yes     Influenza A Ag POC Negative Negative Pos/Neg    Influenza B Ag POC Negative Negative Pos/Neg AMB POC RAPID STREP A   Result Value Ref Range    VALID INTERNAL CONTROL POC Yes     Group A Strep Ag Negative Negative       ASSESSMENT and PLAN    ICD-10-CM ICD-9-CM    1. Tonsillitis J03.90 463    2. Fever, unspecified fever cause R50.9 780.60 AMB POC OBIE INFLUENZA A/B TEST   3.  Sore throat J02.9 462 AMB POC RAPID STREP A      UPPER RESPIRATORY CULTURE     Orders Placed This Encounter    UPPER RESPIRATORY CULTURE    AMB POC OBIE INFLUENZA A/B TEST    AMB POC RAPID STREP A   complete course of antibiotic  Throat culture sent  Consider follow-up with ENT  Continue tylenol or motrin for pain/fever

## 2019-01-15 NOTE — PATIENT INSTRUCTIONS
Tonsillitis: Care Instructions  Your Care Instructions    Tonsillitis is an infection of the tonsils that is caused by bacteria or a virus. The tonsils are in the back of the throat and are part of the immune system. Tonsillitis typically lasts from a few days up to a couple of weeks. Tonsillitis caused by a virus goes away on its own. Tonsillitis caused by the bacteria that causes strep throat is treated with antibiotics. You and your doctor may consider surgery to remove the tonsils (tonsillectomy) if you have serious complications or repeat infections. Follow-up care is a key part of your treatment and safety. Be sure to make and go to all appointments, and call your doctor if you are having problems. It's also a good idea to know your test results and keep a list of the medicines you take. How can you care for yourself at home? · If your doctor prescribed antibiotics, take them as directed. Do not stop taking them just because you feel better. You need to take the full course of antibiotics. · Gargle with warm salt water. This helps reduce swelling and relieve discomfort. Gargle once an hour with 1 teaspoon of salt mixed in 8 fluid ounces of warm water. · Take an over-the-counter pain medicine, such as acetaminophen (Tylenol), ibuprofen (Advil, Motrin), or naproxen (Aleve). Be safe with medicines. Read and follow all instructions on the label. No one younger than 20 should take aspirin. It has been linked to Reye syndrome, a serious illness. · Be careful when taking over-the-counter cold or flu medicines and Tylenol at the same time. Many of these medicines have acetaminophen, which is Tylenol. Read the labels to make sure that you are not taking more than the recommended dose. Too much acetaminophen (Tylenol) can be harmful. · Try an over-the-counter throat spray to relieve throat pain. · Drink plenty of fluids. Fluids may help soothe an irritated throat.  Drink warm or cool liquids (whichever feels better). These include tea, soup, and juice. · Do not smoke, and avoid secondhand smoke. Smoking can make tonsillitis worse. If you need help quitting, talk to your doctor about stop-smoking programs and medicines. These can increase your chances of quitting for good. · Use a vaporizer or humidifier to add moisture to your bedroom. Follow the directions for cleaning the machine. When should you call for help? Call your doctor now or seek immediate medical care if:    · Your pain gets worse on one side of your throat.     · You have a new or higher fever.     · You notice changes in your voice.     · You have trouble opening your mouth.     · You have any trouble breathing.     · You have much more trouble swallowing.     · You have a fever with a stiff neck or a severe headache.     · You are sensitive to light or feel very sleepy or confused.    Watch closely for changes in your health, and be sure to contact your doctor if:    · You do not get better after 2 days. Where can you learn more? Go to http://attila-jeffrey.info/. Enter S991 in the search box to learn more about \"Tonsillitis: Care Instructions. \"  Current as of: March 28, 2018  Content Version: 11.8  © 4989-3361 [employer]. Care instructions adapted under license by [employer] (which disclaims liability or warranty for this information). If you have questions about a medical condition or this instruction, always ask your healthcare professional. [place] [address] any warranty or liability for your use of this information.

## 2019-01-16 ENCOUNTER — TELEPHONE (OUTPATIENT)
Dept: INTERNAL MEDICINE CLINIC | Age: 19
End: 2019-01-16

## 2019-01-16 RX ORDER — AMOXICILLIN AND CLAVULANATE POTASSIUM 875; 125 MG/1; MG/1
1 TABLET, FILM COATED ORAL EVERY 12 HOURS
Qty: 14 TAB | Refills: 0 | Status: SHIPPED | OUTPATIENT
Start: 2019-01-16 | End: 2019-01-23

## 2019-01-16 NOTE — TELEPHONE ENCOUNTER
Patient was seen by Alicia Brenner yesterday. He told her he had enough amoxicillin but he needs a refill. PLease send to [facility] on file. patient's number is [phone]

## 2019-01-16 NOTE — TELEPHONE ENCOUNTER
Contacted patient for further detail  Patient has 7 tablets of amoxicillin- clavuanate 875-125 left.

## 2019-01-17 LAB — BACTERIA SPEC RESP CULT: NORMAL

## 2019-02-25 ENCOUNTER — OFFICE VISIT (OUTPATIENT)
Dept: INTERNAL MEDICINE CLINIC | Age: 19
End: 2019-02-25

## 2019-02-25 VITALS
OXYGEN SATURATION: 98 % | BODY MASS INDEX: 24.72 KG/M2 | SYSTOLIC BLOOD PRESSURE: 120 MMHG | TEMPERATURE: 99.7 F | HEART RATE: 90 BPM | RESPIRATION RATE: 14 BRPM | HEIGHT: 71 IN | DIASTOLIC BLOOD PRESSURE: 70 MMHG | WEIGHT: 176.6 LBS

## 2019-02-25 DIAGNOSIS — M62.830 BACK MUSCLE SPASM: ICD-10-CM

## 2019-02-25 DIAGNOSIS — S39.012A STRAIN OF LUMBAR REGION, INITIAL ENCOUNTER: Primary | ICD-10-CM

## 2019-02-25 RX ORDER — NAPROXEN 500 MG/1
500 TABLET ORAL 2 TIMES DAILY WITH MEALS
Qty: 20 TAB | Refills: 0 | Status: SHIPPED | OUTPATIENT
Start: 2019-02-25 | End: 2019-05-16

## 2019-02-25 NOTE — PROGRESS NOTES
Chief Complaint   Patient presents with    Back Pain     Reviewed record in preparation for visit and have obtained necessary documentation. Identified pt with two pt identifiers(name and ). Health Maintenance Due   Topic    HPV Age 9Y-34Y (3 - Male 3-dose series)    Pneumococcal 19-64 Medium Risk (1 of 1 - PPSV23)         Chief Complaint   Patient presents with    Back Pain        Wt Readings from Last 3 Encounters:   19 176 lb 9.6 oz (80.1 kg)   01/15/19 170 lb 1.6 oz (77.2 kg)   18 171 lb (77.6 kg)     Temp Readings from Last 3 Encounters:   19 99.7 °F (37.6 °C)   01/15/19 (!) 101.7 °F (38.7 °C) (Oral)   18 98.7 °F (37.1 °C) (Oral)     BP Readings from Last 3 Encounters:   19 120/70   01/15/19 110/70   18 110/80     Pulse Readings from Last 3 Encounters:   19 90   01/15/19 (!) 101   18 76           Learning Assessment:  :     Learning Assessment 2018   PRIMARY LEARNER Patient   HIGHEST LEVEL OF EDUCATION - PRIMARY LEARNER  GRADUATED HIGH SCHOOL OR GED   BARRIERS PRIMARY LEARNER NONE   CO-LEARNER CAREGIVER No   PRIMARY LANGUAGE ENGLISH   LEARNER PREFERENCE PRIMARY DEMONSTRATION   ANSWERED BY patient   RELATIONSHIP SELF       Depression Screening:  :     3 most recent PHQ Screens 1/15/2019   Little interest or pleasure in doing things Not at all   Feeling down, depressed, irritable, or hopeless Not at all   Total Score PHQ 2 0       Fall Risk Assessment:  :     No flowsheet data found. Abuse Screening:  :     Abuse Screening Questionnaire 2018   Do you ever feel afraid of your partner? N   Are you in a relationship with someone who physically or mentally threatens you? N   Is it safe for you to go home?  Y       Coordination of Care Questionnaire:  :     1) Have you been to an emergency room, urgent care clinic since your last visit? no   Hospitalized since your last visit? no             2) Have you seen or consulted any other health care providers outside of [address] [place] [address] since your last visit? no  (Include any pap smears or colon screenings in this section.)    3) Do you have an Advance Directive on file? no    4) Are you interested in receiving information on Advance Directives? NO      Patient is accompanied by self I have received verbal consent from Finesse Borges to discuss any/all medical information while they are present in the room. Reviewed record  In preparation for visit and have obtained necessary documentation.

## 2019-02-25 NOTE — PROGRESS NOTES
20 yo male awoke 3 AMs ago with a little low back discomfort which got suddenly worse as the morning went on. He works at a [employer], and he [employer] at school. He has been using a heating pad with minimal help as well as taking Tylenol and Motrin. No weakness in LEs. PE: WNWD WM   BP = 120/70   Back - mild palpable spasm R>L lumbar para-spinous musculature   SLR is neg   Knee DTRs symmetric    Imp: Low Back Strain    Plan: Alternate moist heat and ice   Naproxen for 10 days   Exercises  ____________________________  Expected course of current diagnosed problem(s) as well as expected progression and possible complications, and desired follow up with provider are discussed with patient. Patient is encouraged to be back in touch with any questions or concerns. Patient expresses understanding of plan of care. Patient is given AVS which includes diagnoses, current medications, vitals.

## 2019-02-25 NOTE — LETTER
NOTIFICATION RETURN TO WORK / SCHOOL 
 
2/25/2019 4:24 PM 
 
Mr. Patric Alarcon [address] [place] [id number] To Whom It May Concern: 
 
Patric Alarcon is currently under the care of Sherry Ng. He will return to work/school on: Feb 27, 2019. If there are questions or concerns please have the patient contact our office. Sincerely, Lise Urena NP

## 2019-02-25 NOTE — PATIENT INSTRUCTIONS
Moist heat alternating with cold compress - 20 minutes for each application    May use Tylenol for pain while on prescription Naproxen (do not use Motrin while on the Naproxen)  ________________________________         Low Back Pain: Exercises  Your Care Instructions  Here are some examples of typical rehabilitation exercises for your condition. Start each exercise slowly. Ease off the exercise if you start to have pain. Your doctor or physical therapist will tell you when you can start these exercises and which ones will work best for you. How to do the exercises  Press-up    1. Lie on your stomach, supporting your body with your forearms. 2. Press your elbows down into the floor to raise your upper back. As you do this, relax your stomach muscles and allow your back to arch without using your back muscles. As your press up, do not let your hips or pelvis come off the floor. 3. Hold for 15 to 30 seconds, then relax. 4. Repeat 2 to 4 times. Alternate arm and leg (bird dog) exercise    1. Start on the floor, on your hands and knees. 2. Tighten your belly muscles. 3. Raise one leg off the floor, and hold it straight out behind you. Be careful not to let your hip drop down, because that will twist your trunk. 4. Hold for about 6 seconds, then lower your leg and switch to the other leg. 5. Repeat 8 to 12 times on each leg. 6. Over time, work up to holding for 10 to 30 seconds each time. 7. If you feel stable and secure with your leg raised, try raising the opposite arm straight out in front of you at the same time. Knee-to-chest exercise    1. Lie on your back with your knees bent and your feet flat on the floor. 2. Bring one knee to your chest, keeping the other foot flat on the floor (or keeping the other leg straight, whichever feels better on your lower back). 3. Keep your lower back pressed to the floor. Hold for at least 15 to 30 seconds.   4. Relax, and lower the knee to the starting position. 5. Repeat with the other leg. Repeat 2 to 4 times with each leg. 6. To get more stretch, put your other leg flat on the floor while pulling your knee to your chest.    Curl-ups    1. Lie on the floor on your back with your knees bent at a 90-degree angle. Your feet should be flat on the floor, about 12 inches from your buttocks. 2. Cross your arms over your chest. If this bothers your neck, try putting your hands behind your neck (not your head), with your elbows spread apart. 3. Slowly tighten your belly muscles and raise your shoulder blades off the floor. 4. Keep your head in line with your body, and do not press your chin to your chest.  5. Hold this position for 1 or 2 seconds, then slowly lower yourself back down to the floor. 6. Repeat 8 to 12 times. Pelvic tilt exercise    1. Lie on your back with your knees bent. 2. \"Brace\" your stomach. This means to tighten your muscles by pulling in and imagining your belly button moving toward your spine. You should feel like your back is pressing to the floor and your hips and pelvis are rocking back. 3. Hold for about 6 seconds while you breathe smoothly. 4. Repeat 8 to 12 times. Heel dig bridging    1. Lie on your back with both knees bent and your ankles bent so that only your heels are digging into the floor. Your knees should be bent about 90 degrees. 2. Then push your heels into the floor, squeeze your buttocks, and lift your hips off the floor until your shoulders, hips, and knees are all in a straight line. 3. Hold for about 6 seconds as you continue to breathe normally, and then slowly lower your hips back down to the floor and rest for up to 10 seconds. 4. Do 8 to 12 repetitions. Hamstring stretch in doorway    1. Lie on your back in a doorway, with one leg through the open door. 2. Slide your leg up the wall to straighten your knee. You should feel a gentle stretch down the back of your leg.   3. Hold the stretch for at least 15 to 30 seconds. Do not arch your back, point your toes, or bend either knee. Keep one heel touching the floor and the other heel touching the wall. 4. Repeat with your other leg. 5. Do 2 to 4 times for each leg. Hip flexor stretch    1. Kneel on the floor with one knee bent and one leg behind you. Place your forward knee over your foot. Keep your other knee touching the floor. 2. Slowly push your hips forward until you feel a stretch in the upper thigh of your rear leg. 3. Hold the stretch for at least 15 to 30 seconds. Repeat with your other leg. 4. Do 2 to 4 times on each side. Wall sit    1. Stand with your back 10 to 12 inches away from a wall. 2. Lean into the wall until your back is flat against it. 3. Slowly slide down until your knees are slightly bent, pressing your lower back into the wall. 4. Hold for about 6 seconds, then slide back up the wall. 5. Repeat 8 to 12 times. Follow-up care is a key part of your treatment and safety. Be sure to make and go to all appointments, and call your doctor if you are having problems. It's also a good idea to know your test results and keep a list of the medicines you take. Where can you learn more? Go to http://attila-jeffrey.info/. Enter P438 in the search box to learn more about \"Low Back Pain: Exercises. \"  Current as of: September 20, 2018  Content Version: 11.9  © 6918-3116 [employer]. Care instructions adapted under license by [employer] (which disclaims liability or warranty for this information). If you have questions about a medical condition or this instruction, always ask your healthcare professional. [place] [address] any warranty or liability for your use of this information.

## 2019-03-05 ENCOUNTER — OFFICE VISIT (OUTPATIENT)
Dept: INTERNAL MEDICINE CLINIC | Age: 19
End: 2019-03-05

## 2019-03-05 VITALS
OXYGEN SATURATION: 98 % | SYSTOLIC BLOOD PRESSURE: 90 MMHG | WEIGHT: 171.3 LBS | HEART RATE: 103 BPM | BODY MASS INDEX: 23.98 KG/M2 | RESPIRATION RATE: 16 BRPM | TEMPERATURE: 99.5 F | HEIGHT: 71 IN | DIASTOLIC BLOOD PRESSURE: 60 MMHG

## 2019-03-05 DIAGNOSIS — R68.89 FLU-LIKE SYMPTOMS: ICD-10-CM

## 2019-03-05 DIAGNOSIS — J02.9 SORE THROAT: ICD-10-CM

## 2019-03-05 DIAGNOSIS — J11.1 INFLUENZA: Primary | ICD-10-CM

## 2019-03-05 LAB
FLUAV+FLUBV AG NOSE QL IA.RAPID: NEGATIVE POS/NEG
FLUAV+FLUBV AG NOSE QL IA.RAPID: POSITIVE POS/NEG
S PYO AG THROAT QL: NORMAL
VALID INTERNAL CONTROL?: YES
VALID INTERNAL CONTROL?: YES

## 2019-03-05 NOTE — PATIENT INSTRUCTIONS
Influenza (Flu): Care Instructions  Your Care Instructions    Influenza (flu) is an infection in the lungs and breathing passages. It is caused by the influenza virus. There are different strains, or types, of the flu virus from year to year. Unlike the common cold, the flu comes on suddenly and the symptoms, such as a cough, congestion, fever, chills, fatigue, aches, and pains, are more severe. These symptoms may last up to 10 days. Although the flu can make you feel very sick, it usually doesn't cause serious health problems. Home treatment is usually all you need for flu symptoms. But your doctor may prescribe antiviral medicine to prevent other health problems, such as pneumonia, from developing. Older people and those who have a long-term health condition, such as lung disease, are most at risk for having pneumonia or other health problems. Follow-up care is a key part of your treatment and safety. Be sure to make and go to all appointments, and call your doctor if you are having problems. It's also a good idea to know your test results and keep a list of the medicines you take. How can you care for yourself at home? · Get plenty of rest.  · Drink plenty of fluids, enough so that your urine is light yellow or clear like water. If you have kidney, heart, or liver disease and have to limit fluids, talk with your doctor before you increase the amount of fluids you drink. · Take an over-the-counter pain medicine if needed, such as acetaminophen (Tylenol), ibuprofen (Advil, Motrin), or naproxen (Aleve), to relieve fever, headache, and muscle aches. Read and follow all instructions on the label. No one younger than 20 should take aspirin. It has been linked to Reye syndrome, a serious illness. · Do not smoke. Smoking can make the flu worse. If you need help quitting, talk to your doctor about stop-smoking programs and medicines. These can increase your chances of quitting for good.   · Breathe moist air from a hot shower or from a sink filled with hot water to help clear a stuffy nose. · Before you use cough and cold medicines, check the label. These medicines may not be safe for young children or for people with certain health problems. · If the skin around your nose and lips becomes sore, put some petroleum jelly on the area. · To ease coughing:  ? Drink fluids to soothe a scratchy throat. ? Suck on cough drops or plain hard candy. ? Take an over-the-counter cough medicine that contains dextromethorphan to help you get some sleep. Read and follow all instructions on the label. ? Raise your head at night with an extra pillow. This may help you rest if coughing keeps you awake. · Take any prescribed medicine exactly as directed. Call your doctor if you think you are having a problem with your medicine. To avoid spreading the flu  · Wash your hands regularly, and keep your hands away from your face. · Stay home from school, work, and other public places until you are feeling better and your fever has been gone for at least 24 hours. The fever needs to have gone away on its own without the help of medicine. · Ask people living with you to talk to their doctors about preventing the flu. They may get antiviral medicine to keep from getting the flu from you. · To prevent the flu in the future, get a flu vaccine every fall. Encourage people living with you to get the vaccine. · Cover your mouth when you cough or sneeze. When should you call for help? Call 911 anytime you think you may need emergency care.  For example, call if:    · You have severe trouble breathing.    Call your doctor now or seek immediate medical care if:    · You have new or worse trouble breathing.     · You seem to be getting much sicker.     · You feel very sleepy or confused.     · You have a new or higher fever.     · You get a new rash.    Watch closely for changes in your health, and be sure to contact your doctor if:    · You begin to get better and then get worse.     · You are not getting better after 1 week. Where can you learn more? Go to http://attila-jeffrey.info/. Enter E471 in the search box to learn more about \"Influenza (Flu): Care Instructions. \"  Current as of: September 5, 2018  Content Version: 11.9  © 9509-7931 [employer]. Care instructions adapted under license by [employer] (which disclaims liability or warranty for this information). If you have questions about a medical condition or this instruction, always ask your healthcare professional. [place] [address] any warranty or liability for your use of this information.

## 2019-03-05 NOTE — PROGRESS NOTES
Chief Complaint   Patient presents with    Sore Throat    Chills     Reviewed record in preparation for visit and have obtained necessary documentation. Identified pt with two pt identifiers(name and ). Health Maintenance Due   Topic    HPV Age 9Y-34Y (3 - Male 3-dose series)    Pneumococcal 19-64 Medium Risk (1 of 1 - PPSV23)         Chief Complaint   Patient presents with    Sore Throat    Chills        Wt Readings from Last 3 Encounters:   19 171 lb 4.8 oz (77.7 kg)   19 176 lb 9.6 oz (80.1 kg)   01/15/19 170 lb 1.6 oz (77.2 kg)     Temp Readings from Last 3 Encounters:   19 99.5 °F (37.5 °C) (Oral)   19 99.7 °F (37.6 °C)   01/15/19 (!) 101.7 °F (38.7 °C) (Oral)     BP Readings from Last 3 Encounters:   19 90/60   19 120/70   01/15/19 110/70     Pulse Readings from Last 3 Encounters:   19 (!) 103   19 90   01/15/19 (!) 101           Learning Assessment:  :     Learning Assessment 2019   PRIMARY LEARNER Patient Patient   HIGHEST LEVEL OF EDUCATION - PRIMARY LEARNER  GRADUATED HIGH SCHOOL OR GED GRADUATED HIGH SCHOOL OR GED   BARRIERS PRIMARY LEARNER NONE NONE   CO-LEARNER CAREGIVER - No   PRIMARY LANGUAGE ENGLISH ENGLISH   LEARNER PREFERENCE PRIMARY DEMONSTRATION DEMONSTRATION   ANSWERED BY patient patient   RELATIONSHIP SELF SELF       Depression Screening:  :     3 most recent PHQ Screens 1/15/2019   Little interest or pleasure in doing things Not at all   Feeling down, depressed, irritable, or hopeless Not at all   Total Score PHQ 2 0       Fall Risk Assessment:  :     No flowsheet data found. Abuse Screening:  :     Abuse Screening Questionnaire 2019   Do you ever feel afraid of your partner? N N   Are you in a relationship with someone who physically or mentally threatens you? N N   Is it safe for you to go home?  Y Y       Coordination of Care Questionnaire:  :     1) Have you been to an emergency room, urgent care clinic since your last visit? no   Hospitalized since your last visit? no             2) Have you seen or consulted any other health care providers outside of [address] [place] [address] since your last visit? no  (Include any pap smears or colon screenings in this section.)    3) Do you have an Advance Directive on file? no    4) Are you interested in receiving information on Advance Directives? NO      Patient is accompanied by self I have received verbal consent from Horacio Anderson to discuss any/all medical information while they are present in the room. Reviewed record  In preparation for visit and have obtained necessary documentation.

## 2019-03-05 NOTE — PROGRESS NOTES
HISTORY OF PRESENT ILLNESS  Andry Garduno is a 19 y.o. male. Patient reports fever, chills, body aches, sore throat, congestion, and cough since last night. He has taken motrin, which helped with fever. Patient has recurrent tonsillitis and is followed by ENT. Visit Vitals  BP 90/60 (BP 1 Location: Left arm, BP Patient Position: Sitting)   Pulse (!) 103   Temp 99.5 °F (37.5 °C) (Oral)   Resp 16   Ht 5' 11\" (1.803 m)   Wt 171 lb 4.8 oz (77.7 kg)   SpO2 98%   BMI 23.89 kg/m²       HPI    Review of Systems   Constitutional: Positive for chills, fever and malaise/fatigue. HENT: Positive for congestion and sore throat. Respiratory: Positive for cough. Musculoskeletal: Positive for myalgias. Physical Exam   Constitutional: He is oriented to person, place, and time. He appears well-developed and well-nourished. HENT:   Head: Normocephalic. Right Ear: Hearing, tympanic membrane, external ear and ear canal normal.   Left Ear: Hearing, tympanic membrane, external ear and ear canal normal.   Nose: Mucosal edema and rhinorrhea present. Mouth/Throat: Uvula is midline and mucous membranes are normal. Posterior oropharyngeal erythema present. Neck: Normal range of motion. Neck supple. Cardiovascular: Normal rate, regular rhythm and normal heart sounds. Pulmonary/Chest: Effort normal and breath sounds normal.   Neurological: He is alert and oriented to person, place, and time. Skin: Skin is warm and dry. Psychiatric: He has a normal mood and affect. Results for orders placed or performed in visit on 03/05/19   AMB POC OBIE INFLUENZA A/B TEST   Result Value Ref Range    VALID INTERNAL CONTROL POC Yes     Influenza A Ag POC Positive Negative Pos/Neg    Influenza B Ag POC Negative Negative Pos/Neg   AMB POC RAPID STREP A   Result Value Ref Range    VALID INTERNAL CONTROL POC Yes     Group A Strep Ag Neg-culture sent Negative         ASSESSMENT and PLAN    ICD-10-CM ICD-9-CM    1.  Influenza J11.1 487.1    2. Flu-like symptoms R68.89 780.99 AMB POC OBIE INFLUENZA A/B TEST   3.  Sore throat J02.9 462 AMB POC RAPID STREP A      UPPER RESPIRATORY CULTURE     Orders Placed This Encounter    UPPER RESPIRATORY CULTURE    AMB POC OBIE INFLUENZA A/B TEST    AMB POC RAPID STREP A

## 2019-03-06 ENCOUNTER — TELEPHONE (OUTPATIENT)
Dept: INTERNAL MEDICINE CLINIC | Age: 19
End: 2019-03-06

## 2019-03-06 RX ORDER — OSELTAMIVIR PHOSPHATE 75 MG/1
75 CAPSULE ORAL 2 TIMES DAILY
Qty: 10 CAP | Refills: 0 | Status: SHIPPED | OUTPATIENT
Start: 2019-03-06 | End: 2019-03-11

## 2019-03-06 NOTE — TELEPHONE ENCOUNTER
Pt called said that [place] was supposed to sent a script for TamiFlu to his Pharmacy yesterday.   He uses [facility]/pharmacy #[id number]- [id number] [phone] Pt best contact number is [phone]

## 2019-03-08 LAB — BACTERIA SPEC RESP CULT: NORMAL

## 2019-05-16 ENCOUNTER — APPOINTMENT (OUTPATIENT)
Dept: CT IMAGING | Age: 19
End: 2019-05-16
Attending: PHYSICIAN ASSISTANT
Payer: COMMERCIAL

## 2019-05-16 ENCOUNTER — DOCUMENTATION ONLY (OUTPATIENT)
Dept: INTERNAL MEDICINE CLINIC | Age: 19
End: 2019-05-16

## 2019-05-16 ENCOUNTER — HOSPITAL ENCOUNTER (EMERGENCY)
Age: 19
Discharge: HOME | End: 2019-05-16
Attending: EMERGENCY MEDICINE
Payer: COMMERCIAL

## 2019-05-16 VITALS
RESPIRATION RATE: 16 BRPM | WEIGHT: 165.34 LBS | SYSTOLIC BLOOD PRESSURE: 117 MMHG | DIASTOLIC BLOOD PRESSURE: 61 MMHG | BODY MASS INDEX: 23.06 KG/M2 | TEMPERATURE: 99.2 F | HEART RATE: 84 BPM | OXYGEN SATURATION: 99 %

## 2019-05-16 DIAGNOSIS — J02.0 ACUTE STREPTOCOCCAL PHARYNGITIS: Primary | ICD-10-CM

## 2019-05-16 LAB
ALBUMIN SERPL-MCNC: 4 G/DL (ref 3.5–5)
ALBUMIN/GLOB SERPL: 1 {RATIO} (ref 1.1–2.2)
ALP SERPL-CCNC: 77 U/L (ref 45–117)
ALT SERPL-CCNC: 27 U/L (ref 12–78)
ANION GAP SERPL CALC-SCNC: 8 MMOL/L (ref 5–15)
AST SERPL-CCNC: 18 U/L (ref 15–37)
BASOPHILS # BLD: 0 K/UL (ref 0–0.1)
BASOPHILS NFR BLD: 0 % (ref 0–1)
BILIRUB SERPL-MCNC: 0.8 MG/DL (ref 0.2–1)
BUN SERPL-MCNC: 10 MG/DL (ref 6–20)
BUN/CREAT SERPL: 9 (ref 12–20)
CALCIUM SERPL-MCNC: 10 MG/DL (ref 8.5–10.1)
CHLORIDE SERPL-SCNC: 102 MMOL/L (ref 97–108)
CO2 SERPL-SCNC: 26 MMOL/L (ref 21–32)
CREAT SERPL-MCNC: 1.06 MG/DL (ref 0.7–1.3)
DIFFERENTIAL METHOD BLD: ABNORMAL
EOSINOPHIL # BLD: 0 K/UL (ref 0–0.4)
EOSINOPHIL NFR BLD: 0 % (ref 0–7)
ERYTHROCYTE [DISTWIDTH] IN BLOOD BY AUTOMATED COUNT: 13.7 % (ref 11.5–14.5)
GLOBULIN SER CALC-MCNC: 4.2 G/DL (ref 2–4)
GLUCOSE SERPL-MCNC: 161 MG/DL (ref 65–100)
HCT VFR BLD AUTO: 44.3 % (ref 36.6–50.3)
HGB BLD-MCNC: 14.2 G/DL (ref 12.1–17)
IMM GRANULOCYTES # BLD AUTO: 0.2 K/UL
IMM GRANULOCYTES NFR BLD AUTO: 1 %
LYMPHOCYTES # BLD: 0.4 K/UL (ref 0.8–3.5)
LYMPHOCYTES NFR BLD: 2 % (ref 12–49)
MCH RBC QN AUTO: 29 PG (ref 26–34)
MCHC RBC AUTO-ENTMCNC: 32.1 G/DL (ref 30–36.5)
MCV RBC AUTO: 90.6 FL (ref 80–99)
METAMYELOCYTES NFR BLD MANUAL: 2 %
MONOCYTES # BLD: 0.9 K/UL (ref 0–1)
MONOCYTES NFR BLD: 5 % (ref 5–13)
NEUTS BAND NFR BLD MANUAL: 15 % (ref 0–6)
NEUTS SEG # BLD: 16.4 K/UL (ref 1.8–8)
NEUTS SEG NFR BLD: 75 % (ref 32–75)
NRBC # BLD: 0 K/UL (ref 0–0.01)
NRBC BLD-RTO: 0 PER 100 WBC
PLATELET # BLD AUTO: 239 K/UL (ref 150–400)
PMV BLD AUTO: 9.3 FL (ref 8.9–12.9)
POTASSIUM SERPL-SCNC: 4 MMOL/L (ref 3.5–5.1)
PROT SERPL-MCNC: 8.2 G/DL (ref 6.4–8.2)
RBC # BLD AUTO: 4.89 M/UL (ref 4.1–5.7)
RBC MORPH BLD: ABNORMAL
SODIUM SERPL-SCNC: 136 MMOL/L (ref 136–145)
WBC # BLD AUTO: 18.2 K/UL (ref 4.1–11.1)

## 2019-05-16 PROCEDURE — 74011250636 HC RX REV CODE- 250/636: Performed by: PHYSICIAN ASSISTANT

## 2019-05-16 PROCEDURE — 99282 EMERGENCY DEPT VISIT SF MDM: CPT

## 2019-05-16 PROCEDURE — 74011636320 HC RX REV CODE- 636/320: Performed by: RADIOLOGY

## 2019-05-16 PROCEDURE — 70491 CT SOFT TISSUE NECK W/DYE: CPT

## 2019-05-16 PROCEDURE — 85025 COMPLETE CBC W/AUTO DIFF WBC: CPT

## 2019-05-16 PROCEDURE — 96365 THER/PROPH/DIAG IV INF INIT: CPT

## 2019-05-16 PROCEDURE — 80053 COMPREHEN METABOLIC PANEL: CPT

## 2019-05-16 PROCEDURE — 96366 THER/PROPH/DIAG IV INF ADDON: CPT

## 2019-05-16 PROCEDURE — 74011000258 HC RX REV CODE- 258: Performed by: RADIOLOGY

## 2019-05-16 PROCEDURE — 96375 TX/PRO/DX INJ NEW DRUG ADDON: CPT

## 2019-05-16 PROCEDURE — 36415 COLL VENOUS BLD VENIPUNCTURE: CPT

## 2019-05-16 RX ORDER — MORPHINE SULFATE 2 MG/ML
4 INJECTION, SOLUTION INTRAMUSCULAR; INTRAVENOUS
Status: COMPLETED | OUTPATIENT
Start: 2019-05-16 | End: 2019-05-16

## 2019-05-16 RX ORDER — CLINDAMYCIN PHOSPHATE 600 MG/50ML
600 INJECTION INTRAVENOUS
Status: COMPLETED | OUTPATIENT
Start: 2019-05-16 | End: 2019-05-16

## 2019-05-16 RX ORDER — DEXAMETHASONE SODIUM PHOSPHATE 10 MG/ML
5 INJECTION INTRAMUSCULAR; INTRAVENOUS
Status: COMPLETED | OUTPATIENT
Start: 2019-05-16 | End: 2019-05-16

## 2019-05-16 RX ORDER — SODIUM CHLORIDE 9 MG/ML
1000 INJECTION, SOLUTION INTRAVENOUS ONCE
Status: COMPLETED | OUTPATIENT
Start: 2019-05-16 | End: 2019-05-16

## 2019-05-16 RX ORDER — SODIUM CHLORIDE 0.9 % (FLUSH) 0.9 %
10 SYRINGE (ML) INJECTION
Status: COMPLETED | OUTPATIENT
Start: 2019-05-16 | End: 2019-05-16

## 2019-05-16 RX ADMIN — SODIUM CHLORIDE 100 ML: 900 INJECTION, SOLUTION INTRAVENOUS at 21:24

## 2019-05-16 RX ADMIN — SODIUM CHLORIDE 1000 ML/HR: 900 INJECTION, SOLUTION INTRAVENOUS at 20:33

## 2019-05-16 RX ADMIN — Medication 10 ML: at 21:24

## 2019-05-16 RX ADMIN — MORPHINE SULFATE 4 MG: 2 INJECTION, SOLUTION INTRAMUSCULAR; INTRAVENOUS at 20:33

## 2019-05-16 RX ADMIN — DEXAMETHASONE SODIUM PHOSPHATE 5 MG: 10 INJECTION INTRAMUSCULAR; INTRAVENOUS at 20:33

## 2019-05-16 RX ADMIN — IOPAMIDOL 100 ML: 612 INJECTION, SOLUTION INTRAVENOUS at 21:24

## 2019-05-16 RX ADMIN — CLINDAMYCIN PHOSPHATE 600 MG: 600 INJECTION, SOLUTION INTRAVENOUS at 20:33

## 2019-05-16 NOTE — ED TRIAGE NOTES
TRIAGE: Diagnosed with strep 2 weeks ago but did not complete antibiotics. Seen at [facility] this afternoon for throat swelling and pain, strep positive again. Decadron given. Referred here if swelling worsened.  800 mg ibuprofen at 6pm.

## 2019-05-17 NOTE — DISCHARGE INSTRUCTIONS
Patient Education        Strep Throat: Care Instructions  Your Care Instructions    Strep throat is a bacterial infection that causes sudden, severe sore throat and fever. Strep throat, which is caused by bacteria called streptococcus, is treated with antibiotics. Sometimes a strep test is necessary to tell if the sore throat is caused by strep bacteria. Treatment can help ease symptoms and may prevent future problems. Follow-up care is a key part of your treatment and safety. Be sure to make and go to all appointments, and call your doctor if you are having problems. It's also a good idea to know your test results and keep a list of the medicines you take. How can you care for yourself at home? · Take your antibiotics as directed. Do not stop taking them just because you feel better. You need to take the full course of antibiotics. · Strep throat can spread to others until 24 hours after you begin taking antibiotics. During this time, you should avoid contact with other people at work or home, especially infants and children. Do not sneeze or cough on others, and wash your hands often. Keep your drinking glass and eating utensils separate from those of others, and wash these items well in hot, soapy water. · Gargle with warm salt water at least once each hour to help reduce swelling and make your throat feel better. Use 1 teaspoon of salt mixed in 8 fluid ounces of warm water. · Take an over-the-counter pain medication, such as acetaminophen (Tylenol), ibuprofen (Advil, Motrin), or naproxen (Aleve). Read and follow all instructions on the label. · Try an over-the-counter anesthetic throat spray or throat lozenges, which may help relieve throat pain. · Drink plenty of fluids. Fluids may help soothe an irritated throat. Hot fluids, such as tea or soup, may help your throat feel better. · Eat soft solids and drink plenty of clear liquids.  Flavored ice pops, ice cream, scrambled eggs, sherbet, and gelatin dessert (such as Jell-O) may also soothe the throat. · Get lots of rest.  · Do not smoke, and avoid secondhand smoke. If you need help quitting, talk to your doctor about stop-smoking programs and medicines. These can increase your chances of quitting for good. · Use a vaporizer or humidifier to add moisture to the air in your bedroom. Follow the directions for cleaning the machine. When should you call for help? Call your doctor now or seek immediate medical care if:    · You have a new or higher fever.     · You have a fever with a stiff neck or severe headache.     · You have new or worse trouble swallowing.     · Your sore throat gets much worse on one side.     · Your pain becomes much worse on one side of your throat.    Watch closely for changes in your health, and be sure to contact your doctor if:    · You are not getting better after 2 days (48 hours).     · You do not get better as expected. Where can you learn more? Go to http://attila-jeffrey.info/. Enter K625 in the search box to learn more about \"Strep Throat: Care Instructions. \"  Current as of: March 27, 2018  Content Version: 11.9  © 8658-4936 [employer]. Care instructions adapted under license by [employer] (which disclaims liability or warranty for this information). If you have questions about a medical condition or this instruction, always ask your healthcare professional. [place] [address] any warranty or liability for your use of this information.

## 2019-05-17 NOTE — ED PROVIDER NOTES
19 y.o. male with past medical history significant for Asthma who presents from home with his Mother with chief complaint of throat pain. Pt states he woke up yesterday morning with a sore throat, that has progressively worsened since onset. He was seen at [facility] today at 1430 where he was diagnosed with strep. Pt was administered 10 mg decadron and discharged with Augmentin and Lidocaine mouth wash. He was given return precautions to the ED if his symptoms continued to worsen to be evaluated for a possible abscess. Pt states he returned home and his pain started to worsen and voice increasingly changed. Pt rates his current pain as 8/10. He reports taking Tylenol and motrin at home with no relief of pain, last dose was 800 mg Motrin at 1800. He reports and associated non-productive cough and inability to tolerate PO secondary to pain swallowing. His Mother at bedside, notes the pt is followed by ENT for frequent history of strep and was last seen by Dr. Catina Grant (ENT) last week where they discussed future tonsillectomy. Pt specifically denies any fever, chills, headache, congestion, shortness of breath, chest pain, abdominal pain, nausea, vomiting, diarrhea, dysuria, or urinary frequency. There are no other acute medical concerns at this time. PSHx: Significant for wisdom teeth Social Hx: negative tobacco use, negative EtOH use, negative Illicit Drug use PCP: Alexandra Bond NP Note written by Dawson Grace, as dictated by Lilia Oneil PA-C 8:14 PM 
 
The history is provided by the patient and a parent. No  was used. Past Medical History:  
Diagnosis Date  Asthma Past Surgical History:  
Procedure Laterality Date  HX WISDOM TEETH EXTRACTION  05/2017 Family History:  
Problem Relation Age of Onset  No Known Problems Mother  No Known Problems Father  No Known Problems Sister  No Known Problems Brother Social History Socioeconomic History  Marital status: SINGLE Spouse name: Not on file  Number of children: Not on file  Years of education: Not on file  Highest education level: Not on file Occupational History  Not on file Social Needs  Financial resource strain: Not on file  Food insecurity:  
  Worry: Not on file Inability: Not on file  Transportation needs:  
  Medical: Not on file Non-medical: Not on file Tobacco Use  Smoking status: Never Smoker  Smokeless tobacco: Never Used Substance and Sexual Activity  Alcohol use: Yes Comment: occ  Drug use: No  
 Sexual activity: Yes  
  Partners: Female Birth control/protection: Condom Lifestyle  Physical activity:  
  Days per week: Not on file Minutes per session: Not on file  Stress: Not on file Relationships  Social connections:  
  Talks on phone: Not on file Gets together: Not on file Attends Caodaism service: Not on file Active member of club or organization: Not on file Attends meetings of clubs or organizations: Not on file Relationship status: Not on file  Intimate partner violence:  
  Fear of current or ex partner: Not on file Emotionally abused: Not on file Physically abused: Not on file Forced sexual activity: Not on file Other Topics Concern  Not on file Social History Narrative ** Merged History Encounter ** ALLERGIES: Patient has no known allergies. Review of Systems Constitutional: Negative for chills and fever. HENT: Positive for sore throat, trouble swallowing and voice change. Respiratory: Positive for cough. Negative for shortness of breath. Cardiovascular: Negative for chest pain and leg swelling. Gastrointestinal: Negative for abdominal pain, diarrhea, nausea and vomiting. Genitourinary: Negative for dysuria and frequency. Neurological: Negative for dizziness and headaches. All other systems reviewed and are negative. Vitals:  
 05/16/19 1950 BP: 128/68 Pulse: 90 Resp: 18 Temp: 98.8 °F (37.1 °C) SpO2: 99% Weight: 75 kg (165 lb 5.5 oz) Physical Exam  
Constitutional: He is oriented to person, place, and time. He appears well-developed and well-nourished. No distress. Well appearing  male with hot potato voice HENT:  
Head: Normocephalic and atraumatic. Right Ear: Tympanic membrane, external ear and ear canal normal.  
Left Ear: Tympanic membrane, external ear and ear canal normal.  
Nose: Nose normal. No mucosal edema. Mouth/Throat: Mucous membranes are normal. No uvula swelling (shifted slightly to the left). Oropharyngeal exudate, posterior oropharyngeal edema and posterior oropharyngeal erythema present. Tonsils are 2+ on the right. Tonsils are 2+ on the left. Tonsillar exudate. Eyes: Pupils are equal, round, and reactive to light. Conjunctivae and EOM are normal. Right eye exhibits no discharge. Left eye exhibits no discharge. Neck: Normal range of motion. Neck supple. FROM of neck Cardiovascular: Normal rate, regular rhythm and normal heart sounds. Pulmonary/Chest: Effort normal and breath sounds normal. He has no wheezes. He has no rales. Abdominal: Soft. Bowel sounds are normal. He exhibits no distension. There is no tenderness. There is no guarding. Musculoskeletal: Normal range of motion. Lymphadenopathy:  
  He has cervical adenopathy. Neurological: He is alert and oriented to person, place, and time. No cranial nerve deficit. Skin: Skin is warm and dry. He is not diaphoretic. Psychiatric: He has a normal mood and affect. His behavior is normal.  
Nursing note and vitals reviewed. MDM Number of Diagnoses or Management Options Acute streptococcal pharyngitis:  
Diagnosis management comments: 20 yo  male w c/o ST since yesterday. No e.o airway compromise. FROM of nexk. ? Small PTA vs adenopathy Plan 
Decadron IVF 
clinda CT neck CBC 
CMP Amount and/or Complexity of Data Reviewed Clinical lab tests: ordered and reviewed Tests in the radiology section of CPT®: ordered and reviewed Independent visualization of images, tracings, or specimens: yes Procedures Progress note Labs and imaging reviewed. Ct - for PTA. + lymphadenopathy. Tolerating PO. Hortencia Velazquez Pt has seen Dr. Vladimir Valenzuela ENT in past.  Has rx for clinda and prednisone from urgent care visit today. Hortencia Velazquez Patient's results have been reviewed with them. Patient and/or family have verbally conveyed their understanding and agreement of the patient's signs, symptoms, diagnosis, treatment and prognosis and additionally agree to follow up as recommended or return to the Emergency Room should their condition change prior to follow-up. Discharge instructions have also been provided to the patient with some educational information regarding their diagnosis as well a list of reasons why they would want to return to the ER prior to their follow-up appointment should their condition change. Hortencia Velazquez Discussed case with attending Physician Mattie Hilton. Agrees with care and will D/C with follow up.   Hortencia Velazquez

## 2019-05-17 NOTE — ED TRIAGE NOTES
Pulled to Adult ED, with c/o sore throat, DX with Strep 2 weeks ago, throat inflamed and red, seen at [facility] this afternoon, strep positive for second time, received 1 mg of Decadron and 800 mg of Ibuprofen at [facility].

## 2019-05-18 ENCOUNTER — HOSPITAL ENCOUNTER (EMERGENCY)
Age: 19
Discharge: HOME | End: 2019-05-18
Attending: PEDIATRICS
Payer: COMMERCIAL

## 2019-05-18 ENCOUNTER — APPOINTMENT (OUTPATIENT)
Dept: GENERAL RADIOLOGY | Age: 19
End: 2019-05-18
Attending: PEDIATRICS
Payer: COMMERCIAL

## 2019-05-18 VITALS
TEMPERATURE: 98.1 F | WEIGHT: 162.48 LBS | RESPIRATION RATE: 16 BRPM | HEIGHT: 71 IN | OXYGEN SATURATION: 97 % | HEART RATE: 55 BPM | BODY MASS INDEX: 22.75 KG/M2 | DIASTOLIC BLOOD PRESSURE: 54 MMHG | SYSTOLIC BLOOD PRESSURE: 101 MMHG

## 2019-05-18 DIAGNOSIS — Z87.09 H/O STREPTOCOCCAL PHARYNGITIS: ICD-10-CM

## 2019-05-18 DIAGNOSIS — R07.9 CHEST PAIN, UNSPECIFIED TYPE: ICD-10-CM

## 2019-05-18 DIAGNOSIS — E86.0 DEHYDRATION: Primary | ICD-10-CM

## 2019-05-18 DIAGNOSIS — R11.10 VOMITING, INTRACTABILITY OF VOMITING NOT SPECIFIED, PRESENCE OF NAUSEA NOT SPECIFIED, UNSPECIFIED VOMITING TYPE: ICD-10-CM

## 2019-05-18 LAB
ALBUMIN SERPL-MCNC: 3.6 G/DL (ref 3.5–5)
ALBUMIN/GLOB SERPL: 0.8 {RATIO} (ref 1.1–2.2)
ALP SERPL-CCNC: 66 U/L (ref 45–117)
ALT SERPL-CCNC: 26 U/L (ref 12–78)
ANION GAP SERPL CALC-SCNC: 7 MMOL/L (ref 5–15)
APPEARANCE UR: ABNORMAL
AST SERPL-CCNC: 14 U/L (ref 15–37)
BACTERIA URNS QL MICRO: NEGATIVE /HPF
BASOPHILS # BLD: 0 K/UL (ref 0–0.1)
BASOPHILS NFR BLD: 0 % (ref 0–1)
BILIRUB SERPL-MCNC: 0.4 MG/DL (ref 0.2–1)
BILIRUB UR QL: NEGATIVE
BUN SERPL-MCNC: 17 MG/DL (ref 6–20)
BUN/CREAT SERPL: 20 (ref 12–20)
CALCIUM SERPL-MCNC: 9.4 MG/DL (ref 8.5–10.1)
CHLORIDE SERPL-SCNC: 108 MMOL/L (ref 97–108)
CO2 SERPL-SCNC: 27 MMOL/L (ref 21–32)
COLOR UR: ABNORMAL
COMMENT, HOLDF: NORMAL
CREAT SERPL-MCNC: 0.86 MG/DL (ref 0.7–1.3)
DIFFERENTIAL METHOD BLD: ABNORMAL
EOSINOPHIL # BLD: 0.5 K/UL (ref 0–0.4)
EOSINOPHIL NFR BLD: 3 % (ref 0–7)
EPITH CASTS URNS QL MICRO: ABNORMAL /LPF
ERYTHROCYTE [DISTWIDTH] IN BLOOD BY AUTOMATED COUNT: 13.5 % (ref 11.5–14.5)
GLOBULIN SER CALC-MCNC: 4.4 G/DL (ref 2–4)
GLUCOSE SERPL-MCNC: 95 MG/DL (ref 65–100)
GLUCOSE UR STRIP.AUTO-MCNC: NEGATIVE MG/DL
HCT VFR BLD AUTO: 42.1 % (ref 36.6–50.3)
HETEROPH AB SER QL: NEGATIVE
HGB BLD-MCNC: 13.8 G/DL (ref 12.1–17)
HGB UR QL STRIP: NEGATIVE
HYALINE CASTS URNS QL MICRO: ABNORMAL /LPF (ref 0–5)
IMM GRANULOCYTES # BLD AUTO: 0 K/UL
IMM GRANULOCYTES NFR BLD AUTO: 0 %
KETONES UR QL STRIP.AUTO: NEGATIVE MG/DL
LEUKOCYTE ESTERASE UR QL STRIP.AUTO: NEGATIVE
LIPASE SERPL-CCNC: 36 U/L (ref 73–393)
LYMPHOCYTES # BLD: 1.6 K/UL (ref 0.8–3.5)
LYMPHOCYTES NFR BLD: 10 % (ref 12–49)
MCH RBC QN AUTO: 29.3 PG (ref 26–34)
MCHC RBC AUTO-ENTMCNC: 32.8 G/DL (ref 30–36.5)
MCV RBC AUTO: 89.4 FL (ref 80–99)
MONOCYTES # BLD: 0.9 K/UL (ref 0–1)
MONOCYTES NFR BLD: 6 % (ref 5–13)
MYELOCYTES NFR BLD MANUAL: 1 %
NEUTS BAND NFR BLD MANUAL: 3 % (ref 0–6)
NEUTS SEG # BLD: 12.6 K/UL (ref 1.8–8)
NEUTS SEG NFR BLD: 77 % (ref 32–75)
NITRITE UR QL STRIP.AUTO: NEGATIVE
NRBC # BLD: 0 K/UL (ref 0–0.01)
NRBC BLD-RTO: 0 PER 100 WBC
PH UR STRIP: 6.5 [PH] (ref 5–8)
PLATELET # BLD AUTO: 245 K/UL (ref 150–400)
PMV BLD AUTO: 9.7 FL (ref 8.9–12.9)
POTASSIUM SERPL-SCNC: 3.7 MMOL/L (ref 3.5–5.1)
PROT SERPL-MCNC: 8 G/DL (ref 6.4–8.2)
PROT UR STRIP-MCNC: ABNORMAL MG/DL
RBC # BLD AUTO: 4.71 M/UL (ref 4.1–5.7)
RBC #/AREA URNS HPF: ABNORMAL /HPF (ref 0–5)
RBC MORPH BLD: ABNORMAL
SAMPLES BEING HELD,HOLD: NORMAL
SODIUM SERPL-SCNC: 142 MMOL/L (ref 136–145)
SP GR UR REFRACTOMETRY: 1.03 (ref 1–1.03)
TROPONIN I SERPL-MCNC: <0.05 NG/ML
UA: UC IF INDICATED,UAUC: ABNORMAL
UROBILINOGEN UR QL STRIP.AUTO: 2 EU/DL (ref 0.2–1)
WBC # BLD AUTO: 15.7 K/UL (ref 4.1–11.1)
WBC URNS QL MICRO: ABNORMAL /HPF (ref 0–4)

## 2019-05-18 PROCEDURE — 81001 URINALYSIS AUTO W/SCOPE: CPT

## 2019-05-18 PROCEDURE — 96365 THER/PROPH/DIAG IV INF INIT: CPT

## 2019-05-18 PROCEDURE — 71046 X-RAY EXAM CHEST 2 VIEWS: CPT

## 2019-05-18 PROCEDURE — 74011250636 HC RX REV CODE- 250/636: Performed by: PEDIATRICS

## 2019-05-18 PROCEDURE — 84484 ASSAY OF TROPONIN QUANT: CPT

## 2019-05-18 PROCEDURE — 83690 ASSAY OF LIPASE: CPT

## 2019-05-18 PROCEDURE — 93005 ELECTROCARDIOGRAM TRACING: CPT

## 2019-05-18 PROCEDURE — 74011000250 HC RX REV CODE- 250: Performed by: PEDIATRICS

## 2019-05-18 PROCEDURE — 74011250637 HC RX REV CODE- 250/637: Performed by: PEDIATRICS

## 2019-05-18 PROCEDURE — 80053 COMPREHEN METABOLIC PANEL: CPT

## 2019-05-18 PROCEDURE — 86308 HETEROPHILE ANTIBODY SCREEN: CPT

## 2019-05-18 PROCEDURE — 85025 COMPLETE CBC W/AUTO DIFF WBC: CPT

## 2019-05-18 PROCEDURE — 99284 EMERGENCY DEPT VISIT MOD MDM: CPT

## 2019-05-18 PROCEDURE — 96361 HYDRATE IV INFUSION ADD-ON: CPT

## 2019-05-18 PROCEDURE — 36415 COLL VENOUS BLD VENIPUNCTURE: CPT

## 2019-05-18 PROCEDURE — 96375 TX/PRO/DX INJ NEW DRUG ADDON: CPT

## 2019-05-18 RX ORDER — HYDROCODONE BITARTRATE AND ACETAMINOPHEN 5; 325 MG/1; MG/1
1 TABLET ORAL
Qty: 2 TAB | Refills: 0 | Status: SHIPPED | OUTPATIENT
Start: 2019-05-18 | End: 2019-05-20

## 2019-05-18 RX ORDER — MAG HYDROX/ALUMINUM HYD/SIMETH 200-200-20
30 SUSPENSION, ORAL (FINAL DOSE FORM) ORAL
Status: COMPLETED | OUTPATIENT
Start: 2019-05-18 | End: 2019-05-18

## 2019-05-18 RX ORDER — DEXAMETHASONE SODIUM PHOSPHATE 10 MG/ML
10 INJECTION INTRAMUSCULAR; INTRAVENOUS
Status: COMPLETED | OUTPATIENT
Start: 2019-05-18 | End: 2019-05-18

## 2019-05-18 RX ORDER — KETOROLAC TROMETHAMINE 30 MG/ML
30 INJECTION, SOLUTION INTRAMUSCULAR; INTRAVENOUS
Status: COMPLETED | OUTPATIENT
Start: 2019-05-18 | End: 2019-05-18

## 2019-05-18 RX ORDER — ONDANSETRON 8 MG/1
8 TABLET, ORALLY DISINTEGRATING ORAL
Qty: 6 TAB | Refills: 0 | Status: SHIPPED | OUTPATIENT
Start: 2019-05-18 | End: 2019-05-24

## 2019-05-18 RX ORDER — ONDANSETRON 2 MG/ML
8 INJECTION INTRAMUSCULAR; INTRAVENOUS
Status: COMPLETED | OUTPATIENT
Start: 2019-05-18 | End: 2019-05-18

## 2019-05-18 RX ORDER — CLINDAMYCIN PHOSPHATE 600 MG/50ML
600 INJECTION INTRAVENOUS ONCE
Status: COMPLETED | OUTPATIENT
Start: 2019-05-18 | End: 2019-05-18

## 2019-05-18 RX ADMIN — SODIUM CHLORIDE 1000 ML: 900 INJECTION, SOLUTION INTRAVENOUS at 10:51

## 2019-05-18 RX ADMIN — KETOROLAC TROMETHAMINE 30 MG: 30 INJECTION, SOLUTION INTRAMUSCULAR at 10:40

## 2019-05-18 RX ADMIN — FAMOTIDINE 20 MG: 10 INJECTION, SOLUTION INTRAVENOUS at 10:51

## 2019-05-18 RX ADMIN — DEXAMETHASONE SODIUM PHOSPHATE 10 MG: 10 INJECTION INTRAMUSCULAR; INTRAVENOUS at 10:30

## 2019-05-18 RX ADMIN — ALUMINUM HYDROXIDE, MAGNESIUM HYDROXIDE, AND SIMETHICONE 30 ML: 200; 200; 20 SUSPENSION ORAL at 12:56

## 2019-05-18 RX ADMIN — CLINDAMYCIN PHOSPHATE 600 MG: 600 INJECTION, SOLUTION INTRAVENOUS at 09:33

## 2019-05-18 RX ADMIN — SODIUM CHLORIDE 1000 ML: 900 INJECTION, SOLUTION INTRAVENOUS at 09:13

## 2019-05-18 RX ADMIN — ONDANSETRON 8 MG: 2 INJECTION INTRAMUSCULAR; INTRAVENOUS at 09:29

## 2019-05-18 NOTE — ED NOTES
Pt took slushi PO. No vomiting noted. Pt complains of \"same pain\". MD notified and ordered Toradol.

## 2019-05-18 NOTE — DISCHARGE INSTRUCTIONS
Continue Augmentin x 10 days. Take with food. May use Mylanta as needed. Use Motrin 600 mg for pain once every 8 hours if needed. May take 1 Norco tab once every 8 hours for severe pain not relived by Motrin    May take Zofran 1 tab once every 8 hours for nausea or vomiting. Encourage fluids and soft foods. Follow up with Dr. Sera Ramirez ENT on Monday. Return to the Emergency Department for any worsening symptoms, any trouble breathing, fevers, persisitent vomiting or other new concerns. Dehydration: Care Instructions  Your Care Instructions  Dehydration happens when your body loses too much fluid. This might happen when you do not drink enough water or you lose large amounts of fluids from your body because of diarrhea, vomiting, or sweating. Severe dehydration can be life-threatening. Water and minerals called electrolytes help put your body fluids back in balance. Learn the early signs of fluid loss, and drink more fluids to prevent dehydration. Follow-up care is a key part of your treatment and safety. Be sure to make and go to all appointments, and call your doctor if you are having problems. It's also a good idea to know your test results and keep a list of the medicines you take. How can you care for yourself at home? · To prevent dehydration, drink plenty of fluids, enough so that your urine is light yellow or clear like water. Choose water and other caffeine-free clear liquids until you feel better. If you have kidney, heart, or liver disease and have to limit fluids, talk with your doctor before you increase the amount of fluids you drink. · If you do not feel like eating or drinking, try taking small sips of water, sports drinks, or other rehydration drinks. · Get plenty of rest.  To prevent dehydration  · Add more fluids to your diet and daily routine, unless your doctor has told you not to. · During hot weather, drink more fluids.  Drink even more fluids if you exercise a lot. Stay away from drinks with alcohol or caffeine. · Watch for the symptoms of dehydration. These include:  ? A dry, sticky mouth. ? Dark yellow urine, and not much of it. ? Dry and sunken eyes. ? Feeling very tired. · Learn what problems can lead to dehydration. These include:  ? Diarrhea, fever, and vomiting. ? Any illness with a fever, such as pneumonia or the flu. ? Activities that cause heavy sweating, such as endurance races and heavy outdoor work in hot or humid weather. ? Alcohol or drug abuse or withdrawal.  ? Certain medicines, such as cold and allergy pills (antihistamines), diet pills (diuretics), and laxatives. ? Certain diseases, such as diabetes, cancer, and heart or kidney disease. When should you call for help? Call 911 anytime you think you may need emergency care. For example, call if:    · You passed out (lost consciousness).    Call your doctor now or seek immediate medical care if:    · You are confused and cannot think clearly.     · You are dizzy or lightheaded, or you feel like you may faint.     · You have signs of needing more fluids. You have sunken eyes and a dry mouth, and you pass only a little dark urine.     · You cannot keep fluids down.    Watch closely for changes in your health, and be sure to contact your doctor if:    · You are not making tears.     · Your skin is very dry and sags slowly back into place after you pinch it.     · Your mouth and eyes are very dry. Where can you learn more? Go to http://attila-jeffrey.info/. Enter Q959 in the search box to learn more about \"Dehydration: Care Instructions. \"  Current as of: September 23, 2018  Content Version: 11.9  © 1014-3138 [employer]. Care instructions adapted under license by [employer] (which disclaims liability or warranty for this information).  If you have questions about a medical condition or this instruction, always ask your healthcare professional. [employer], [facility] disclaims any warranty or liability for your use of this information. Patient Education        Oral Rehydration: Care Instructions  Your Care Instructions    Dehydration occurs when your body loses too much water. This can happen if you do not drink enough fluids or lose a lot of fluid due to diarrhea, vomiting, or sweating. Being dehydrated can cause health problems and can even be life-threatening. To replace lost fluids, you need to drink liquid that contains special chemicals called electrolytes. Electrolytes keep your body working well. Plain water does not have electrolytes. You also need to rest to prevent more fluid loss. Replacing water and electrolytes (oral rehydration) completely takes about 36 hours. But you should feel better within a few hours. Follow-up care is a key part of your treatment and safety. Be sure to make and go to all appointments, and call your doctor if you are having problems. It's also a good idea to know your test results and keep a list of the medicines you take. How can you care for yourself at home? · Take frequent sips of a drink such as Gatorade, Powerade, or other rehydration drinks that your doctor suggests. These replace both fluid and important chemicals (electrolytes) you need for balance in your blood. · Drink 2 quarts of cool liquid over 2 to 4 hours. You should have at least 10 glasses of liquid a day to replace lost fluid. If you have kidney, heart, or liver disease and have to limit fluids, talk with your doctor before you increase the amount of fluids you drink. · Make your own drink. Measure everything carefully. The drink may not work well or may even be harmful if the amounts are off. ? 1 quart water  ? ½ teaspoon salt  ? 6 teaspoons sugar  · Do not drink liquid with caffeine, such as coffee and rod. · Do not drink any alcohol. It can make you dehydrated.   · Drink plenty of fluids, enough so that your urine is light yellow or clear like water. If you have kidney, heart, or liver disease and have to limit fluids, talk with your doctor before you increase the amount of fluids you drink. When should you call for help? Call 911 anytime you think you may need emergency care. For example, call if:    · You have signs of severe dehydration, such as:  ? You are confused or unable to stay awake.  ? You passed out (lost consciousness).    Call your doctor now or seek immediate medical care if:    · You still have signs of dehydration. You have sunken eyes and a dry mouth, and you pass only a little dark urine.     · You are dizzy or lightheaded, or you feel like you may faint.     · You are not able to keep down fluids.    Watch closely for changes in your health, and be sure to contact your doctor if:    · You do not get better as expected. Where can you learn more? Go to http://attila-jeffrey.info/. Enter I040 in the search box to learn more about \"Oral Rehydration: Care Instructions. \"  Current as of: September 23, 2018  Content Version: 11.9  © 7504-6433 [employer]. Care instructions adapted under license by [employer] (which disclaims liability or warranty for this information). If you have questions about a medical condition or this instruction, always ask your healthcare professional. [address] any warranty or liability for your use of this information. Patient Education        Strep Throat: Care Instructions  Your Care Instructions    Strep throat is a bacterial infection that causes sudden, severe sore throat and fever. Strep throat, which is caused by bacteria called streptococcus, is treated with antibiotics. Sometimes a strep test is necessary to tell if the sore throat is caused by strep bacteria. Treatment can help ease symptoms and may prevent future problems. Follow-up care is a key part of your treatment and safety.  Be sure to make and go to all appointments, and call your doctor if you are having problems. It's also a good idea to know your test results and keep a list of the medicines you take. How can you care for yourself at home? · Take your antibiotics as directed. Do not stop taking them just because you feel better. You need to take the full course of antibiotics. · Strep throat can spread to others until 24 hours after you begin taking antibiotics. During this time, you should avoid contact with other people at work or home, especially infants and children. Do not sneeze or cough on others, and wash your hands often. Keep your drinking glass and eating utensils separate from those of others, and wash these items well in hot, soapy water. · Gargle with warm salt water at least once each hour to help reduce swelling and make your throat feel better. Use 1 teaspoon of salt mixed in 8 fluid ounces of warm water. · Take an over-the-counter pain medication, such as acetaminophen (Tylenol), ibuprofen (Advil, Motrin), or naproxen (Aleve). Read and follow all instructions on the label. · Try an over-the-counter anesthetic throat spray or throat lozenges, which may help relieve throat pain. · Drink plenty of fluids. Fluids may help soothe an irritated throat. Hot fluids, such as tea or soup, may help your throat feel better. · Eat soft solids and drink plenty of clear liquids. Flavored ice pops, ice cream, scrambled eggs, sherbet, and gelatin dessert (such as Jell-O) may also soothe the throat. · Get lots of rest.  · Do not smoke, and avoid secondhand smoke. If you need help quitting, talk to your doctor about stop-smoking programs and medicines. These can increase your chances of quitting for good. · Use a vaporizer or humidifier to add moisture to the air in your bedroom. Follow the directions for cleaning the machine. When should you call for help? Call your doctor now or seek immediate medical care if:    · You have a new or higher fever.   · You have a fever with a stiff neck or severe headache.     · You have new or worse trouble swallowing.     · Your sore throat gets much worse on one side.     · Your pain becomes much worse on one side of your throat.    Watch closely for changes in your health, and be sure to contact your doctor if:    · You are not getting better after 2 days (48 hours).     · You do not get better as expected. Where can you learn more? Go to http://attila-jeffrey.info/. Enter K625 in the search box to learn more about \"Strep Throat: Care Instructions. \"  Current as of: March 27, 2018  Content Version: 11.9  © 3099-0068 [employer]. Care instructions adapted under license by [employer] (which disclaims liability or warranty for this information). If you have questions about a medical condition or this instruction, always ask your healthcare professional. [place] [address] any warranty or liability for your use of this information. We hope that we have addressed all of your medical concerns. The examination and treatment you received in the Emergency Department were for an emergent problem and were not intended as complete care. It is important that you follow up with your healthcare provider(s) for ongoing care. If your symptoms worsen or do not improve as expected, and you are unable to reach your usual health care provider(s), you should return to the Emergency Department. Today's healthcare is undergoing tremendous change, and patient satisfaction surveys are one of the many tools to assess the quality of medical care. You may receive a survey from the [employer] organization regarding your experience in the Emergency Department. I hope that your experience has been completely positive, particularly the medical care that I provided.   As such, please participate in the survey; anything less than excellent does not meet my expectations or intentions. [address] [place] [address] and [address] [place] [address] participate in nationally recognized quality of care measures. If your blood pressure is greater than 120/80, as reported below, we urge that you seek medical care to address the potential of high blood pressure, commonly known as hypertension. Hypertension can be hereditary or can be caused by certain medical conditions, pain, stress, or \"white coat syndrome. \"       Please make an appointment with your health care provider(s) for follow up of your Emergency Department visit. VITALS:   Patient Vitals for the past 8 hrs:   Temp Pulse Resp BP SpO2   05/18/19 1322 98.1 °F (36.7 °C) (!) 55 16 101/54 97 %   05/18/19 1034 98.8 °F (37.1 °C) 70 20 133/90 99 %   05/18/19 0841 98.5 °F (36.9 °C) 67 16 142/73 99 %          Thank you for allowing us to provide you with medical care today. We realize that you have many choices for your emergency care needs. Please choose us in the future for any continued health care needs. Scot Callejas MD    [facility].   Office: [phone]            Recent Results (from the past 24 hour(s))   SAMPLES BEING HELD    Collection Time: 05/18/19  9:15 AM   Result Value Ref Range    SAMPLES BEING HELD 1RED 1BC(SILV)     COMMENT        Add-on orders for these samples will be processed based on acceptable specimen integrity and analyte stability, which may vary by analyte.    CBC WITH AUTOMATED DIFF    Collection Time: 05/18/19  9:15 AM   Result Value Ref Range    WBC 15.7 (H) 4.1 - 11.1 K/uL    RBC 4.71 4.10 - 5.70 M/uL    HGB 13.8 12.1 - 17.0 g/dL    HCT 42.1 36.6 - 50.3 %    MCV 89.4 80.0 - 99.0 FL    MCH 29.3 26.0 - 34.0 PG    MCHC 32.8 30.0 - 36.5 g/dL    RDW 13.5 11.5 - 14.5 %    PLATELET 176 904 - 620 K/uL    MPV 9.7 8.9 - 12.9 FL    NRBC 0.0 0  WBC    ABSOLUTE NRBC 0.00 0.00 - 0.01 K/uL    NEUTROPHILS 77 (H) 32 - 75 %    BAND NEUTROPHILS 3 0 - 6 %    LYMPHOCYTES 10 (L) 12 - 49 %    MONOCYTES 6 5 - 13 %    EOSINOPHILS 3 0 - 7 %    BASOPHILS 0 0 - 1 %    MYELOCYTES 1 (H) 0 %    IMMATURE GRANULOCYTES 0 %    ABS. NEUTROPHILS 12.6 (H) 1.8 - 8.0 K/UL    ABS. LYMPHOCYTES 1.6 0.8 - 3.5 K/UL    ABS. MONOCYTES 0.9 0.0 - 1.0 K/UL    ABS. EOSINOPHILS 0.5 (H) 0.0 - 0.4 K/UL    ABS. BASOPHILS 0.0 0.0 - 0.1 K/UL    ABS. IMM. GRANS. 0.0 K/UL    DF MANUAL      RBC COMMENTS NORMOCYTIC, NORMOCHROMIC     METABOLIC PANEL, COMPREHENSIVE    Collection Time: 05/18/19  9:15 AM   Result Value Ref Range    Sodium 142 136 - 145 mmol/L    Potassium 3.7 3.5 - 5.1 mmol/L    Chloride 108 97 - 108 mmol/L    CO2 27 21 - 32 mmol/L    Anion gap 7 5 - 15 mmol/L    Glucose 95 65 - 100 mg/dL    BUN 17 6 - 20 MG/DL    Creatinine 0.86 0.70 - 1.30 MG/DL    BUN/Creatinine ratio 20 12 - 20      GFR est AA >60 >60 ml/min/1.73m2    GFR est non-AA >60 >60 ml/min/1.73m2    Calcium 9.4 8.5 - 10.1 MG/DL    Bilirubin, total 0.4 0.2 - 1.0 MG/DL    ALT (SGPT) 26 12 - 78 U/L    AST (SGOT) 14 (L) 15 - 37 U/L    Alk.  phosphatase 66 45 - 117 U/L    Protein, total 8.0 6.4 - 8.2 g/dL    Albumin 3.6 3.5 - 5.0 g/dL    Globulin 4.4 (H) 2.0 - 4.0 g/dL    A-G Ratio 0.8 (L) 1.1 - 2.2     MONONUCLEOSIS SCREEN    Collection Time: 05/18/19  9:15 AM   Result Value Ref Range    Mononucleosis screen NEGATIVE  NEG     URINALYSIS W/ REFLEX CULTURE    Collection Time: 05/18/19  9:15 AM   Result Value Ref Range    Color YELLOW/STRAW      Appearance CLOUDY (A) CLEAR      Specific gravity 1.028 1.003 - 1.030      pH (UA) 6.5 5.0 - 8.0      Protein TRACE (A) NEG mg/dL    Glucose NEGATIVE  NEG mg/dL    Ketone NEGATIVE  NEG mg/dL    Bilirubin NEGATIVE  NEG      Blood NEGATIVE  NEG      Urobilinogen 2.0 (H) 0.2 - 1.0 EU/dL    Nitrites NEGATIVE  NEG      Leukocyte Esterase NEGATIVE  NEG      WBC 0-4 0 - 4 /hpf    RBC 0-5 0 - 5 /hpf    Epithelial cells FEW FEW /lpf    Bacteria NEGATIVE  NEG /hpf    UA:UC IF INDICATED CULTURE NOT INDICATED BY UA RESULT CNI      Hyaline cast 0-2 0 - 5 /lpf   LIPASE    Collection Time: 05/18/19  9:15 AM   Result Value Ref Range    Lipase 36 (L) 73 - 393 U/L   TROPONIN I    Collection Time: 05/18/19  9:15 AM   Result Value Ref Range    Troponin-I, Qt. <0.05 <0.05 ng/mL   EKG, 12 LEAD, INITIAL    Collection Time: 05/18/19 12:18 PM   Result Value Ref Range    Ventricular Rate 70 BPM    Atrial Rate 70 BPM    P-R Interval 128 ms    QRS Duration 82 ms    Q-T Interval 382 ms    QTC Calculation (Bezet) 412 ms    Calculated P Axis 64 degrees    Calculated R Axis 55 degrees    Calculated T Axis 61 degrees    Diagnosis       ** Poor data quality, interpretation may be adversely affected  Sinus rhythm with marked sinus arrhythmia  No previous ECGs available         Xr Chest Pa Lat    Result Date: 5/18/2019  PA AND LATERAL CHEST RADIOGRAPHS: 5/18/2019 12:23 PM INDICATION: Diagnosed with strep throat on Thursday, currently on antibiotics. Nausea, vomiting, and fever. COMPARISON: None. TECHNIQUE: Frontal and lateral radiographs of the chest. FINDINGS: The lungs are clear. The central airways are patent. The heart size is normal. No pneumothorax or pleural effusion. IMPRESSION: Clear lungs.

## 2019-05-18 NOTE — ED TRIAGE NOTES
Patient began vomiting during the night. Seen here 3 days ago after being diagnosed with strep at [facility]. Patient continues to have difficulty swallowing. No medications PTA.

## 2019-05-18 NOTE — ED NOTES
Pt awake, alert and sitting up in bed. Pt states \"I feel much better and drinking Gatorade\". No vomiting noted in ED.

## 2019-05-18 NOTE — ED PROVIDER NOTES
HPI  
 
History of present illness: 
 
Patient is a 41-year-old male who presents to the emergency room with his mother secondary to complaints of acute onset of vomiting at 1 AM. Patient was seen and evaluated in the ER 2 days earlier secondary to complaints of severe throat pain. Mother states patient has been in his usual state of good health approximately 3 weeks earlier he was diagnosed with influenza and strep and treated with antibiotics and Tamiflu. Mother states he seemed to get better with to the beach. She states patient returned home early secondary to complaints of severe throat pain. He was seen and evaluated it [facility] clinic 2 days earlier with here he was diagnosed with strep discharge with Decadron Augmentin and lidocaine swish. Later that same day he came to [facility] where he had an extensive workup which included CT of neck clindamycin IV fluids and Decadron. Review of medical chart at the time reviewed the following CBC with white cell count of 18.2  ,  75 segs 15 lymphs CMP: Unremarkable CT soft tissue neck with contrast revealed prominence of tonsils which may represent tonsillitis without evidence of abscess. Also with bilateral reactive lymph nodes Patient discharge home to continue his Augmentin. Patient and mother both state that he completed Augmentin as he only received a 2 day course. Mother felt that he was improving his voice seemed to improve and to take fluids and food better last night and then awoke at 1 AM with persistent emesis. He states he has vomited too numerous to count times. Nonbloody nonbilious no fever no headache positive sore throat. He is able to swallow saliva. No chest pain or trouble breathing positive nausea with vomiting but no abdominal pain no dysuria no hematuria. Patient denies any drug use including marijuana. No other complaints no modifying factors no other concerns Review of systems: 10 point review is conducted. All pertinent positives and negatives are as stated in the history of present illness Allergies: None Medications: All completed Immunizations: Up to date Past medical history: Patient has been followed by Dr. Nati Mccollum for recurrent strep Family history: Noncontributory to this illness Social history: Lives with family. Denies smoking or drug use Past Medical History:  
Diagnosis Date  Asthma Past Surgical History:  
Procedure Laterality Date  HX WISDOM TEETH EXTRACTION  05/2017 Family History:  
Problem Relation Age of Onset  No Known Problems Mother  No Known Problems Father  No Known Problems Sister  No Known Problems Brother Social History Socioeconomic History  Marital status: SINGLE Spouse name: Not on file  Number of children: Not on file  Years of education: Not on file  Highest education level: Not on file Occupational History  Not on file Social Needs  Financial resource strain: Not on file  Food insecurity:  
  Worry: Not on file Inability: Not on file  Transportation needs:  
  Medical: Not on file Non-medical: Not on file Tobacco Use  Smoking status: Never Smoker  Smokeless tobacco: Never Used Substance and Sexual Activity  Alcohol use: Yes Comment: occ  Drug use: No  
 Sexual activity: Yes  
  Partners: Female Birth control/protection: Condom Lifestyle  Physical activity:  
  Days per week: Not on file Minutes per session: Not on file  Stress: Not on file Relationships  Social connections:  
  Talks on phone: Not on file Gets together: Not on file Attends Advent service: Not on file Active member of club or organization: Not on file Attends meetings of clubs or organizations: Not on file Relationship status: Not on file  Intimate partner violence: Fear of current or ex partner: Not on file Emotionally abused: Not on file Physically abused: Not on file Forced sexual activity: Not on file Other Topics Concern  Not on file Social History Narrative ** Merged History Encounter ** ALLERGIES: Patient has no known allergies. Review of Systems Constitutional: Positive for activity change and appetite change. Negative for fever. HENT: Positive for sore throat and trouble swallowing. Negative for ear pain. Eyes: Negative for discharge and redness. Respiratory: Negative for cough. Cardiovascular: Negative for chest pain. Gastrointestinal: Positive for nausea and vomiting. Negative for abdominal pain, constipation and diarrhea. Genitourinary: Negative for decreased urine volume, difficulty urinating and dysuria. Musculoskeletal: Negative for gait problem and neck pain. Skin: Negative for rash and wound. Neurological: Negative for weakness. All other systems reviewed and are negative. Vitals:  
 05/18/19 0841 05/18/19 1034 05/18/19 1045 05/18/19 1322 BP: 142/73 133/90  101/54 Pulse: 67 70  (!) 55 Resp: 16 20  16 Temp: 98.5 °F (36.9 °C) 98.8 °F (37.1 °C)  98.1 °F (36.7 °C) SpO2: 99% 99%  97% Weight:      
Height:   5' 11\" (1.803 m) Physical Exam  
Nursing note and vitals reviewed. PE: 
GEN:  WDWN male alert non toxic in NAD speaks easily with hoarse voice, no hot potato voice, holding bucket with dry heaves SK: CRT < 2 sec, good distal pulses. No lesions, no rashes, dry lips, dry mm HEENT: H: AT/NC. E: EOMI , PERRL, E: TM clear  N/T:  Red oropharynx, tonsils are 2-3 +, no exudates, no asymmetry, uvula normal size and midline, peritonsillar abscess or cellulitis, no swelling of neck no neck tenderness NECK: supple, no meningismus. No pain on palpation of C/T L spine, + shotty anterior cervical nodes Chest: Clear to auscultation, clear BS.  NO rales, rhonchi, wheezes or distress. No  Retraction. Chest Wall: no tenderness on palpation CV: Regular rate and rhythm. Normal S1 S2 . No murmur, gallops or thrills ABD: Soft non tender, no hepatomegaly, good bowel sound, no guarding, benign MS: FROM all extremities, no long bone tenderness. No swelling, cyanosis, no edema. Good distal pulses. Gait normal 
NEURO: Alert. No focality. Cranial nerves 2-12 grossly intact. GCS 15  Behavior and mentation appropriate for age, excellent cognitive function MDM Number of Diagnoses or Management Options Chest pain, unspecified type:  
Dehydration: H/O streptococcal pharyngitis:  
Vomiting, intractability of vomiting not specified, presence of nausea not specified, unspecified vomiting type:  
Diagnosis management comments:  
 
 
Medical decision making: 
 
Patient with dehydration by physical exam 
CT of the neck soft tissues performed 2 days earlier no abscess noted tissue noted Mother feels patient was clinically improving until emesis this morning CBC: WBC 15.7 hemoglobin 13.8 normal platelets differential 77 segs 3 bands 10 lymphs Urinalysis: Cloudy specific gravity 1.028 
CMP: Unremarkable Mono screen: Negative The patient received Zofran 8 mg normal saline bolus and one dose of clindamycin IV The patient had repeat exams improve speaking he is here has taken 8 ounces of orange slushy Now complaining of substernal pain esophageal pain with swallowing. Offered GI cocktail. Mother declined as she stated he was discharged home with lidocaine which he did swallow and  pain seemed worse. They stated to me he drank not more than 1-2 tablespoons of the product. EKG chest x-ray troponin performed patient states pain was severe and substernal 
Troponin less than 0.05 chest x-ray unremarkable and EKG 
ECG: Heart rate 70 LA interval 0.12 QRS 0.08 QTC 0.4 normal sinus rhythm Patient given Mylanta with improvement Pepcid given IV 
 Second normal saline bolus provided. On repeat exam after second bolus patient feels much better is talking smiling and has drank almost 16 ounces of Gatorade. States he feels much better and will followup with ENT on Monday. All precautions reviewed with the patient and family their understanding and agreed with the plan. Patient discharged with Zofran and a prescription for 2 tablets of Norco in case pain  severe Child has been re-examined and appears well. Child is active, interactive and appears well hydrated. Laboratory tests, medications, x-rays, diagnosis, follow up plan and return instructions have been reviewed and discussed with the family. Family has had the opportunity to ask questions about their child's care. Family expresses understanding and agreement with care plan, follow up and return instructions. Family agrees to return the child to the ER in 48 hours if their symptoms are not improving or immediately if they have any change in their condition. Family understands to follow up with their pediatrician as instructed to ensure resolution of the issue seen for today. Clinical impression: 
Dehydration Chest pain Vomiting H/o strep pharyngitis Amount and/or Complexity of Data Reviewed Clinical lab tests: ordered and reviewed Procedures

## 2019-05-18 NOTE — ED NOTES
Education:  Pt's mother educated on the medications administered in the ED. Pt's mother verbalized understanding of the medications administered in the ED.

## 2019-05-18 NOTE — ED NOTES
Pt resting with his eyes closed. Pt's respirations are regular and unlabored. No vomiting noted in ED. Pt in no apparent distress.

## 2019-05-18 NOTE — ED NOTES
RN to room to give GI cocktail. Patient's mother concerned with the lidocaine in the medication. Dr. Malena Elliott notified and states she will go speak with the family.

## 2019-05-19 LAB
ATRIAL RATE: 70 BPM
CALCULATED P AXIS, ECG09: 64 DEGREES
CALCULATED R AXIS, ECG10: 55 DEGREES
CALCULATED T AXIS, ECG11: 61 DEGREES
DIAGNOSIS, 93000: NORMAL
P-R INTERVAL, ECG05: 128 MS
Q-T INTERVAL, ECG07: 382 MS
QRS DURATION, ECG06: 82 MS
QTC CALCULATION (BEZET), ECG08: 412 MS
VENTRICULAR RATE, ECG03: 70 BPM

## 2019-05-20 ENCOUNTER — OFFICE VISIT (OUTPATIENT)
Dept: INTERNAL MEDICINE CLINIC | Age: 19
End: 2019-05-20

## 2019-05-20 VITALS
RESPIRATION RATE: 16 BRPM | BODY MASS INDEX: 22.39 KG/M2 | TEMPERATURE: 98.5 F | HEART RATE: 83 BPM | SYSTOLIC BLOOD PRESSURE: 90 MMHG | DIASTOLIC BLOOD PRESSURE: 70 MMHG | WEIGHT: 159.9 LBS | HEIGHT: 71 IN | OXYGEN SATURATION: 97 %

## 2019-05-20 DIAGNOSIS — J02.9 SORE THROAT: ICD-10-CM

## 2019-05-20 DIAGNOSIS — J03.01 ACUTE RECURRENT STREPTOCOCCAL TONSILLITIS: Primary | ICD-10-CM

## 2019-05-20 RX ORDER — METHYLPREDNISOLONE SODIUM SUCCINATE 40 MG/ML
40 INJECTION, POWDER, LYOPHILIZED, FOR SOLUTION INTRAMUSCULAR; INTRAVENOUS ONCE
Qty: 1 VIAL | Refills: 0
Start: 2019-05-20 | End: 2019-05-20

## 2019-05-20 RX ORDER — CLINDAMYCIN HYDROCHLORIDE 300 MG/1
300 CAPSULE ORAL 3 TIMES DAILY
Qty: 30 CAP | Refills: 0 | Status: SHIPPED | OUTPATIENT
Start: 2019-05-20 | End: 2019-05-30

## 2019-05-20 NOTE — PROGRESS NOTES
HISTORY OF PRESENT ILLNESS  Arin De La Garza is a 19 y.o. male. Patient reports ongoing sore throat, difficulty swallowing, and nausea with stomach pain x 5-6 days. Patient was diagnosed with strep at [facility] about 6 days ago. Symptoms became worse so he went to the ER. He was given Augmentin, IV clindamycin, and steroids. He went to the ER again 2 days ago for vomiting x multiple episodes and stomach pains. He had throat tightness, but CT did not show abscess. He was given zofran, toradol, and GI cocktail, which helped. He got another dose of clindamycin IV. He reports only mild improvement in throat pain over the past 6 days with Augmentin. Patient was last diagnosed with strep on April 20. He was put on a course of clindamycin and got better, but did not complete entire course. He saw Dr. Ivonne Avilez a week ago and is planning to have tonsillectomy, but no date has been set. Patient has history of recurrent strep, but usually gets better much quicker than with this episode. No fever. Visit Vitals  BP 90/70 (BP 1 Location: Left arm, BP Patient Position: Sitting)   Pulse 83   Temp 98.5 °F (36.9 °C) (Oral)   Resp 16   Ht 5' 11\" (1.803 m)   Wt 159 lb 14.4 oz (72.5 kg)   SpO2 97%   BMI 22.30 kg/m²       HPI    Review of Systems   HENT: Positive for sore throat. Physical Exam   Constitutional: He is oriented to person, place, and time. He appears well-developed and well-nourished. HENT:   Head: Normocephalic. Mouth/Throat: Uvula is midline and mucous membranes are normal. Oropharyngeal exudate, posterior oropharyngeal edema and posterior oropharyngeal erythema present. Neck: Normal range of motion. Neck supple. Abdominal: Soft. Bowel sounds are normal. He exhibits no distension. There is no tenderness. Lymphadenopathy:     He has cervical adenopathy (tender). Neurological: He is alert and oriented to person, place, and time. Skin: Skin is warm and dry.        ASSESSMENT and PLAN    ICD-10-CM ICD-9-CM 1. Acute recurrent streptococcal tonsillitis J03.01 034.0    2.  Sore throat J02.9 462 METHYLPREDNISOLONE INJECTION      [place] THER/PROPH/DIAG INJECTION, SUBCUT/IM     Orders Placed This Encounter    clindamycin (CLEOCIN) 300 mg capsule    methylPREDNISolone (SOLU-MEDROL) 40 mg solr solution   follow-up appointment scheduled with ENT tomorrow  Change antibiotic  Dose of steroids given in office-tolerated well

## 2019-05-20 NOTE — PROGRESS NOTES
Chief Complaint   Patient presents with    Nausea    Abdominal Pain     ER f/u      Reviewed record in preparation for visit and have obtained necessary documentation. Identified pt with two pt identifiers(name and ). Health Maintenance Due   Topic    Pneumococcal 0-64 years (1 of 1 - PPSV23)    HPV Age 9Y-34Y (2 - Male 3-dose series)         Chief Complaint   Patient presents with    Nausea    Abdominal Pain     ER f/u         Wt Readings from Last 3 Encounters:   19 159 lb 14.4 oz (72.5 kg)   19 162 lb 7.7 oz (73.7 kg)   19 165 lb 5.5 oz (75 kg)     Temp Readings from Last 3 Encounters:   19 98.5 °F (36.9 °C) (Oral)   19 98.1 °F (36.7 °C)   19 99.2 °F (37.3 °C)     BP Readings from Last 3 Encounters:   19 90/70   19 101/54   19 117/61     Pulse Readings from Last 3 Encounters:   19 83   19 (!) 55   19 84           Learning Assessment:  :     Learning Assessment 2019   PRIMARY LEARNER Patient Patient   HIGHEST LEVEL OF EDUCATION - PRIMARY LEARNER  GRADUATED HIGH SCHOOL OR GED GRADUATED HIGH SCHOOL OR GED   BARRIERS PRIMARY LEARNER NONE NONE   CO-LEARNER CAREGIVER - No   PRIMARY LANGUAGE ENGLISH ENGLISH   LEARNER PREFERENCE PRIMARY DEMONSTRATION DEMONSTRATION   ANSWERED BY patient patient   RELATIONSHIP SELF SELF       Depression Screening:  :     3 most recent PHQ Screens 1/15/2019   Little interest or pleasure in doing things Not at all   Feeling down, depressed, irritable, or hopeless Not at all   Total Score PHQ 2 0       Fall Risk Assessment:  :     No flowsheet data found. Abuse Screening:  :     Abuse Screening Questionnaire 2019   Do you ever feel afraid of your partner? N N   Are you in a relationship with someone who physically or mentally threatens you? N N   Is it safe for you to go home?  Y Y       Coordination of Care Questionnaire:  :     1) Have you been to an emergency room, urgent care clinic since your last visit? yes   Hospitalized since your last visit? no             2) Have you seen or consulted any other health care providers outside of [address] [place] [address] since your last visit? yes  (Include any pap smears or colon screenings in this section.)    3) Do you have an Advance Directive on file? no    4) Are you interested in receiving information on Advance Directives? NO      Patient is accompanied by mother I have received verbal consent from Venkat Vila to discuss any/all medical information while they are present in the room. Reviewed record  In preparation for visit and have obtained necessary documentation.

## 2019-05-20 NOTE — LETTER
NOTIFICATION RETURN TO WORK / SCHOOL 
 
5/20/2019 11:29 AM 
 
Mr. Dilshad Chris [address] [place] [id number] To Whom It May Concern: 
 
Dilsahd Chris is currently under the care of Sherry Ng. He will return to work/school on: 5/22/19 If there are questions or concerns please have the patient contact our office.  
 
 
 
Sincerely, 
 
 
Yfn Shin NP

## 2019-05-22 ENCOUNTER — TELEPHONE (OUTPATIENT)
Dept: INTERNAL MEDICINE CLINIC | Age: 19
End: 2019-05-22

## 2019-05-22 NOTE — TELEPHONE ENCOUNTER
Returned call to patients mother Naya Vasquez    Dx/d with strep 5/16 at [facility], treated with decardron and Augmentin and viscous lidocaine    Went to ED later than day, CT negative for PTA    Went to the ED 5/18 for vomiting and pain in esophagus. Was IV clindamycin, decardron, was discharged to continue augmentin    Patient miserable, not better. Is about 50% better  Vomiting over the weekend, seen in ED. Saw NP Thrift Monday 5/20, was switched to clindamycin, given methylprednisolone    Saw ENT Dr. Valente Carrington yesterday. Will be planning on having tonsillectomy 2 weeks after starting to feel better. Has been on abx for 6 days  Throat has improved, but feeling terribly in other aspects    Has lost 11lb since last week  Very lethargic, fatigued  Feeling very full, no appetitie  No vomiting  Does have a lot of coughing and mucous    Was wheezing early Monday morning- resolved with nebulizer. CXR 5/18 without acute process  Having a lot of mucous    Is barely able to get fluids in, feels very uncomfortable on his gut.   Makes him feel sick, nauseated    Has not had bowel movement in 6 days  IS passing gas  IS urinating    No fever    Discussed plan with Dr. Obie Kurse prilosec 20mg BID  Start miralax 1-2x/day  Everything room temperatiure, bland, nothing sharp  Magic mouth mash 1 tsp 4x/day    Reviewed needs to go to the ED if develops worsening lethargy, stops passing gas, restarts vomiting, stops urinating, develops wrosening abdominal pain, or any other concerning symptoms    Mom verbalized understanding    Gunnar Hurley NP

## 2019-05-22 NOTE — TELEPHONE ENCOUNTER
Received an incoming call from patient's mother Katey/HIPKAREN. She states patient is still feeling bad after strep throat dx. She states patient throat pain has subsided and his throat is clear, but patient is not eating or drinking. Patient has not had anything to eat since Monday ([employer]) and he had emesis x1. Patient is also very fatigue. Patient had 40 oz of water within in last 12 hours per mother, and has been drinking sips of gatorade. She states she had to force patient to have that. Patient complains of a fullness feeling, therefore he does not want to eat anything. Patient's mother would like to know of any suggestions or recommendations until his appointment. I advised to make sure patient is getting fluids in to avoid dehydration, and to head to ED if abdominal pain is severe or extreme. She verbalized an understanding. Patient has upcoming appointment on 5/24/19.

## 2019-05-24 ENCOUNTER — OFFICE VISIT (OUTPATIENT)
Dept: INTERNAL MEDICINE CLINIC | Age: 19
End: 2019-05-24

## 2019-05-24 VITALS
DIASTOLIC BLOOD PRESSURE: 90 MMHG | SYSTOLIC BLOOD PRESSURE: 120 MMHG | BODY MASS INDEX: 22.41 KG/M2 | TEMPERATURE: 98.3 F | HEART RATE: 72 BPM | OXYGEN SATURATION: 96 % | WEIGHT: 160.1 LBS | RESPIRATION RATE: 14 BRPM | HEIGHT: 71 IN

## 2019-05-24 DIAGNOSIS — R06.2 WHEEZING: ICD-10-CM

## 2019-05-24 DIAGNOSIS — J03.01 RECURRENT STREPTOCOCCAL TONSILLITIS: Primary | ICD-10-CM

## 2019-05-24 RX ORDER — ALBUTEROL SULFATE 90 UG/1
2 AEROSOL, METERED RESPIRATORY (INHALATION)
Qty: 1 INHALER | Refills: 11 | Status: SHIPPED | OUTPATIENT
Start: 2019-05-24 | End: 2019-06-07 | Stop reason: SDUPTHER

## 2019-05-24 NOTE — PROGRESS NOTES
Subjective:      Nate Alan is a 19 y.o. male who presents today for f/u strep throat    dx'd with strep 5/16. Saw multiple providers over the next few days  Given augmentin and clindamycin. Also had total of 4 doses of steroids  5/22 mother reporting unable to eat or drink, lethargic, complaining of \"esophagus and stomach\" pain  Started on prilosec bid    Today feeling significantly better \"I feel great\". Symptoms have completely resolved  No more throat pain or abdominal pain, no nausea or vomiting    Had complained of wheezing earlier this week. 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 Objective:     Visit Vitals  /90 (BP 1 Location: Left arm, BP Patient Position: Sitting)   Pulse 72   Temp 98.3 °F (36.8 °C) (Oral)   Resp 14   Ht 5' 11\" (1.803 m)   Wt 160 lb 1.6 oz (72.6 kg)   SpO2 96%   BMI 22.33 kg/m²     General appearance: alert, cooperative, no distress, appears stated age  Throat: right tonsil 2+, left tonsil 1+. No significant erythema or exudate. + PND  Lungs: faint expiratory wheezing KADEN, otherwise CTA. Good airflow throughout. No cough  Heart: regular rate and rhythm, S1, S2 normal, no murmur, click, rub or gallop  Extremities: extremities normal, atraumatic, steady gait  Neurologic: Grossly normal  Psych: calm, cooperative, appropriate affect      Assessment/Plan:     1. Recurrent streptococcal tonsillitis  - follows with ENT Dr. Bill Brody  - needs tonsillectomy. Mother to call and schedule to be done asap    2. Wheezing  - with history childhood asthma  - refill albuterol to be used prn  - albuterol sulfate (PROAIR RESPICLICK) 90 mcg/actuation aepb; Take 2 Puffs by inhalation every four (4) hours. Dispense: 1 Inhaler; Refill: 11  - albuterol (PROVENTIL HFA, VENTOLIN HFA, PROAIR HFA) 90 mcg/actuation inhaler; Take 2 Puffs by inhalation every four (4) hours as needed for Wheezing. Dispense: 1 Inhaler; Refill: 11      Advised him to call back or return to office if symptoms worsen/change/persist.  Discussed expected course/resolution/complications of diagnosis in detail with patient. Medication risks/benefits/costs/interactions/alternatives discussed with patient. He was given an after visit summary which includes diagnoses, current medications, & vitals. He expressed understanding with the diagnosis and plan.     ARNOL Zaldivar

## 2019-05-24 NOTE — PATIENT INSTRUCTIONS
Albuterol 2 puffs every 4 hours as needed for coughing/wheezing    Continue prilosec for another week

## 2019-05-24 NOTE — PROGRESS NOTES
Chief Complaint   Patient presents with    Abdominal Pain     f/u     F/u [facility]  Reviewed record in preparation for visit and have obtained necessary documentation. Identified pt with two pt identifiers(name and ). Health Maintenance Due   Topic    Pneumococcal 0-64 years (1 of 1 - PPSV23)    HPV Age 9Y-34Y (3 - Male 3-dose series)         Chief Complaint   Patient presents with    Abdominal Pain     f/u        Wt Readings from Last 3 Encounters:   19 160 lb 1.6 oz (72.6 kg)   19 159 lb 14.4 oz (72.5 kg)   19 162 lb 7.7 oz (73.7 kg)     Temp Readings from Last 3 Encounters:   19 98.3 °F (36.8 °C) (Oral)   19 98.5 °F (36.9 °C) (Oral)   19 98.1 °F (36.7 °C)     BP Readings from Last 3 Encounters:   19 120/90   19 90/70   19 101/54     Pulse Readings from Last 3 Encounters:   19 (!) 110   19 83   19 (!) 55           Learning Assessment:  :     Learning Assessment 2019   PRIMARY LEARNER Patient Patient   HIGHEST LEVEL OF EDUCATION - PRIMARY LEARNER  GRADUATED HIGH SCHOOL OR GED GRADUATED HIGH SCHOOL OR GED   BARRIERS PRIMARY LEARNER NONE NONE   CO-LEARNER CAREGIVER - No   PRIMARY LANGUAGE ENGLISH ENGLISH   LEARNER PREFERENCE PRIMARY DEMONSTRATION DEMONSTRATION   ANSWERED BY patient patient   RELATIONSHIP SELF SELF       Depression Screening:  :     3 most recent PHQ Screens 1/15/2019   Little interest or pleasure in doing things Not at all   Feeling down, depressed, irritable, or hopeless Not at all   Total Score PHQ 2 0       Fall Risk Assessment:  :     No flowsheet data found. Abuse Screening:  :     Abuse Screening Questionnaire 2019   Do you ever feel afraid of your partner? N N   Are you in a relationship with someone who physically or mentally threatens you? N N   Is it safe for you to go home?  Y Y       Coordination of Care Questionnaire:  :     1) Have you been to an emergency room, urgent care clinic since your last visit? no   Hospitalized since your last visit? no             2) Have you seen or consulted any other health care providers outside of [address] [place] [address] since your last visit? no  (Include any pap smears or colon screenings in this section.)    3) Do you have an Advance Directive on file? no    4) Are you interested in receiving information on Advance Directives? NO      Patient is accompanied by self I have received verbal consent from Peggy Green to discuss any/all medical information while they are present in the room. Reviewed record  In preparation for visit and have obtained necessary documentation.

## 2019-06-07 ENCOUNTER — OFFICE VISIT (OUTPATIENT)
Dept: INTERNAL MEDICINE CLINIC | Age: 19
End: 2019-06-07

## 2019-06-07 VITALS
OXYGEN SATURATION: 99 % | TEMPERATURE: 97.9 F | BODY MASS INDEX: 21.42 KG/M2 | RESPIRATION RATE: 16 BRPM | DIASTOLIC BLOOD PRESSURE: 56 MMHG | WEIGHT: 153 LBS | HEART RATE: 51 BPM | SYSTOLIC BLOOD PRESSURE: 100 MMHG | HEIGHT: 71 IN

## 2019-06-07 DIAGNOSIS — D72.829 LEUKOCYTOSIS, UNSPECIFIED TYPE: ICD-10-CM

## 2019-06-07 DIAGNOSIS — Z01.818 PRE-OP EVALUATION: Primary | ICD-10-CM

## 2019-06-07 DIAGNOSIS — J35.1 ENLARGED TONSILS: ICD-10-CM

## 2019-06-07 RX ORDER — CEFDINIR 300 MG/1
300 CAPSULE ORAL 2 TIMES DAILY
COMMUNITY
End: 2019-09-25 | Stop reason: ALTCHOICE

## 2019-06-07 RX ORDER — ONDANSETRON 8 MG/1
TABLET, ORALLY DISINTEGRATING ORAL
Refills: 0 | COMMUNITY
Start: 2019-05-18 | End: 2020-01-31

## 2019-06-07 RX ORDER — CIPROFLOXACIN AND DEXAMETHASONE 3; 1 MG/ML; MG/ML
4 SUSPENSION/ DROPS AURICULAR (OTIC) 2 TIMES DAILY
COMMUNITY
End: 2019-09-25 | Stop reason: ALTCHOICE

## 2019-06-07 NOTE — PROGRESS NOTES
20 yo in for pre op eval for tonsillectomy on 6/19 by Dr. Froy Winters and [facility]. See scanned in form.

## 2019-06-08 LAB
BASOPHILS # BLD AUTO: 0 X10E3/UL (ref 0–0.2)
BASOPHILS NFR BLD AUTO: 0 %
BNP SERPL-MCNC: 10.6 PG/ML (ref 0–100)
EOSINOPHIL # BLD AUTO: 0.1 X10E3/UL (ref 0–0.4)
EOSINOPHIL NFR BLD AUTO: 3 %
ERYTHROCYTE [DISTWIDTH] IN BLOOD BY AUTOMATED COUNT: 14.3 % (ref 12.3–15.4)
HCT VFR BLD AUTO: 36.6 % (ref 37.5–51)
HGB BLD-MCNC: 12.5 G/DL (ref 13–17.7)
IMM GRANULOCYTES # BLD AUTO: 0 X10E3/UL (ref 0–0.1)
IMM GRANULOCYTES NFR BLD AUTO: 0 %
LYMPHOCYTES # BLD AUTO: 2.2 X10E3/UL (ref 0.7–3.1)
LYMPHOCYTES NFR BLD AUTO: 39 %
MCH RBC QN AUTO: 29.3 PG (ref 26.6–33)
MCHC RBC AUTO-ENTMCNC: 34.2 G/DL (ref 31.5–35.7)
MCV RBC AUTO: 86 FL (ref 79–97)
MONOCYTES # BLD AUTO: 0.6 X10E3/UL (ref 0.1–0.9)
MONOCYTES NFR BLD AUTO: 11 %
NEUTROPHILS # BLD AUTO: 2.6 X10E3/UL (ref 1.4–7)
NEUTROPHILS NFR BLD AUTO: 47 %
PLATELET # BLD AUTO: 301 X10E3/UL (ref 150–450)
RBC # BLD AUTO: 4.26 X10E6/UL (ref 4.14–5.8)
WBC # BLD AUTO: 5.5 X10E3/UL (ref 3.4–10.8)

## 2019-06-10 NOTE — PROGRESS NOTES
Verbal order from [facility] requesting additional BMP lab be ordered. Pt notified of need for additional lab testing and states he will come into the office tomorrow. BMP order placed.

## 2019-06-12 ENCOUNTER — PATIENT MESSAGE (OUTPATIENT)
Dept: INTERNAL MEDICINE CLINIC | Age: 19
End: 2019-06-12

## 2019-06-12 LAB
BUN SERPL-MCNC: 13 MG/DL (ref 6–20)
BUN/CREAT SERPL: 13 (ref 9–20)
CALCIUM SERPL-MCNC: 9.8 MG/DL (ref 8.7–10.2)
CHLORIDE SERPL-SCNC: 100 MMOL/L (ref 96–106)
CO2 SERPL-SCNC: 24 MMOL/L (ref 20–29)
CREAT SERPL-MCNC: 1.04 MG/DL (ref 0.76–1.27)
GLUCOSE SERPL-MCNC: 85 MG/DL (ref 65–99)
POTASSIUM SERPL-SCNC: 4.2 MMOL/L (ref 3.5–5.2)
SODIUM SERPL-SCNC: 138 MMOL/L (ref 134–144)

## 2019-06-26 NOTE — TELEPHONE ENCOUNTER
Patient has not read [email or web address] message. Message printed by Shannan Comer NP, and placed to be mailed to address on file.

## 2019-09-25 ENCOUNTER — OFFICE VISIT (OUTPATIENT)
Dept: INTERNAL MEDICINE CLINIC | Age: 19
End: 2019-09-25

## 2019-09-25 VITALS
DIASTOLIC BLOOD PRESSURE: 70 MMHG | WEIGHT: 156.2 LBS | HEART RATE: 104 BPM | TEMPERATURE: 98.1 F | BODY MASS INDEX: 21.87 KG/M2 | OXYGEN SATURATION: 98 % | HEIGHT: 71 IN | RESPIRATION RATE: 17 BRPM | SYSTOLIC BLOOD PRESSURE: 102 MMHG

## 2019-09-25 DIAGNOSIS — R09.81 NASAL CONGESTION: ICD-10-CM

## 2019-09-25 DIAGNOSIS — R52 BODY ACHES: ICD-10-CM

## 2019-09-25 DIAGNOSIS — J06.9 VIRAL UPPER RESPIRATORY TRACT INFECTION: Primary | ICD-10-CM

## 2019-09-25 LAB
QUICKVUE INFLUENZA TEST: NEGATIVE
VALID INTERNAL CONTROL?: YES

## 2019-09-25 NOTE — PATIENT INSTRUCTIONS

## 2019-09-25 NOTE — PROGRESS NOTES
HISTORY OF PRESENT ILLNESS  Lucy Abad is a 19 y.o. male. Patient reports body aches, congestion, ear fullness, and fatigue starting yesterday. He has taken motrin with some relief yesterday, no medication today. Denies sore throat. He had tonsillectomy June 2019. Denies fever. Visit Vitals  /70 (BP 1 Location: Left arm, BP Patient Position: Sitting)   Pulse (!) 104   Temp 98.1 °F (36.7 °C) (Oral)   Resp 17   Ht 5' 11\" (1.803 m)   Wt 156 lb 3.2 oz (70.9 kg)   SpO2 98%   BMI 21.79 kg/m²       HPI    Review of Systems   Constitutional: Positive for malaise/fatigue. HENT: Positive for congestion. Musculoskeletal: Positive for myalgias. Physical Exam   Constitutional: He is oriented to person, place, and time. He appears well-developed and well-nourished. HENT:   Head: Normocephalic. Right Ear: Hearing, tympanic membrane, external ear and ear canal normal.   Left Ear: Hearing, tympanic membrane, external ear and ear canal normal.   Nose: Mucosal edema (erythematous) and rhinorrhea (clear) present. Mouth/Throat: Uvula is midline and mucous membranes are normal. Posterior oropharyngeal erythema (mild) present. Neck: Normal range of motion. Neck supple. Cardiovascular: Normal rate, regular rhythm and normal heart sounds. Pulmonary/Chest: Effort normal and breath sounds normal.   Lymphadenopathy:     He has cervical adenopathy (mild). Neurological: He is alert and oriented to person, place, and time. Skin: Skin is warm and dry. Psychiatric: He has a normal mood and affect. Results for orders placed or performed in visit on 09/25/19   AMB POC RAPID INFLUENZA TEST   Result Value Ref Range    VALID INTERNAL CONTROL POC Yes     QuickVue Influenza test Negative Negative       ASSESSMENT and PLAN    ICD-10-CM ICD-9-CM    1. Viral upper respiratory tract infection J06.9 465.9    2.  Nasal congestion R09.81 478.19 AMB POC RAPID INFLUENZA TEST   3. Body aches R52 780.96      Orders Placed This Encounter    AMB POC RAPID INFLUENZA TEST   motrin PRN for fever,body aches  Rest and hydrate  flonase  Sudafed x 5 days

## 2020-01-31 ENCOUNTER — OFFICE VISIT (OUTPATIENT)
Dept: FAMILY MEDICINE CLINIC | Age: 20
End: 2020-01-31

## 2020-01-31 VITALS
HEIGHT: 71 IN | TEMPERATURE: 98.3 F | DIASTOLIC BLOOD PRESSURE: 64 MMHG | OXYGEN SATURATION: 99 % | WEIGHT: 173.6 LBS | HEART RATE: 88 BPM | BODY MASS INDEX: 24.3 KG/M2 | SYSTOLIC BLOOD PRESSURE: 112 MMHG | RESPIRATION RATE: 17 BRPM

## 2020-01-31 DIAGNOSIS — Z13.220 SCREENING FOR LIPID DISORDERS: ICD-10-CM

## 2020-01-31 DIAGNOSIS — Z00.00 ROUTINE GENERAL MEDICAL EXAMINATION AT HEALTH CARE FACILITY: Primary | ICD-10-CM

## 2020-01-31 DIAGNOSIS — Z13.1 SCREENING FOR DIABETES MELLITUS: ICD-10-CM

## 2020-01-31 DIAGNOSIS — Z11.3 SCREENING FOR STD (SEXUALLY TRANSMITTED DISEASE): ICD-10-CM

## 2020-01-31 DIAGNOSIS — Z23 ENCOUNTER FOR IMMUNIZATION: ICD-10-CM

## 2020-01-31 NOTE — PROGRESS NOTES
Patient Name: Dione Telles   MRN: [id number]    Kena Bojorquez is a 22 y.o. male who presents with the following: Here to establish care with new PCP. STD screening: amenable    CAD risk factors:  HTN: wnl  BP Readings from Last 3 Encounters:   01/31/20 112/64   09/25/19 102/70   06/07/19 100/56     Lipid: due  DM: due    Review of Systems   Constitutional: Negative for fever, malaise/fatigue and weight loss. Respiratory: Negative for cough, hemoptysis, shortness of breath and wheezing. Cardiovascular: Negative for chest pain, palpitations, leg swelling and PND. Gastrointestinal: Negative for abdominal pain, constipation, diarrhea, nausea and vomiting. The patient's medications, allergies, past medical history, surgical history, family history and social history were reviewed and updated where appropriate. Prior to Admission medications    Medication Sig Start Date End Date Taking? Authorizing Provider   ondansetron (ZOFRAN ODT) 8 mg disintegrating tablet DISSOLVE 1 TABLET BY MOUTH EVERY 8 HOURS AS NEEDED FOR NAUSEA 5/18/19 1/31/20  Provider, Historical   albuterol sulfate (PROAIR RESPICLICK) 90 mcg/actuation aepb Take 2 Puffs by inhalation every four (4) hours.  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, IgG & Nuclear Antigen Ab, IgG       Past Surgical History:   Procedure Laterality Date    HX TONSILLECTOMY  06/2019    HX WISDOM TEETH EXTRACTION  05/2017       Family History   Problem Relation Age of Onset    No Known Problems Mother     No Known Problems Father     No Known Problems Sister     No Known Problems Brother        Social History     Socioeconomic History    Marital status: SINGLE     Spouse name: Not on file    Number of children: Not on file    Years of education: Not on file   [employer] education level: Not on file   Occupational History    Not on file   Social Needs    Financial resource strain: Not on file    Food insecurity:     Worry: Not on file     Inability: Not on file    Transportation needs:     Medical: Not on file     Non-medical: Not on file   Tobacco Use    Smoking status: Never Smoker    Smokeless tobacco: Never Used   Substance and Sexual Activity    Alcohol use: Yes     Comment: occ    Drug use: No    Sexual activity: Yes     Partners: Female     Birth control/protection: Condom   Lifestyle    Physical activity:     Days per week: Not on file     Minutes per session: Not on file    Stress: Not on file   Relationships    Social connections:     Talks on phone: Not on file     Gets together: Not on file     Attends Mormon service: Not on file     Active member of club or organization: Not on file     Attends meetings of clubs or organizations: Not on file     Relationship status: Not on file    Intimate partner violence:     Fear of current or ex partner: Not on file     Emotionally abused: Not on file     Physically abused: Not on file     Forced sexual activity: Not on file   Other Topics Concern    Not on file   Social History Narrative    ** Merged History Encounter **              OBJECTIVE    Visit Vitals  /64 (BP 1 Location: Left arm, BP Patient Position: Sitting)   Pulse 88   Temp 98.3 °F (36.8 °C) (Oral)   Resp 17   Ht 5' 11\" (1.803 m)   Wt 173 lb 9.6 oz (78.7 kg)   SpO2 99%   BMI 24.21 kg/m²       Physical Exam  Constitutional:       General: He is not in acute distress. Appearance: He is not diaphoretic. HENT:      Head: Normocephalic. Right Ear: External ear normal.      Left Ear: External ear normal.   Eyes:      Conjunctiva/sclera: Conjunctivae normal.      Pupils: Pupils are equal, round, and reactive to light. Cardiovascular:      Rate and Rhythm: Normal rate and regular rhythm. Heart sounds: Normal heart sounds. No murmur.  No friction rub. No gallop. Pulmonary:      Effort: Pulmonary effort is normal. No respiratory distress. Breath sounds: Normal breath sounds. No wheezing or rales. Abdominal:      General: Bowel sounds are normal. There is no distension. Palpations: Abdomen is soft. Tenderness: There is no abdominal tenderness. There is no guarding or rebound. Skin:     General: Skin is warm and dry. Neurological:      Mental Status: He is alert and oriented to person, place, and time. Psychiatric:         Mood and Affect: Affect normal.         Cognition and Memory: Memory normal.         Judgment: Judgment normal.           ASSESSMENT AND PLAN  Conner Ramsey is a 22 y.o. male who presents today for:    1. Routine general medical examination at health care facility  Reviewed age appropriate screening tests as recommended by the [place]PST[place] Preventive Services Database with patient today. - CBC WITH AUTOMATED DIFF    2. Screening for diabetes mellitus  - HEMOGLOBIN A1C WITH EAG    3. Screening for lipid disorders  - METABOLIC PANEL, COMPREHENSIVE  - LIPID PANEL    4. Screening for STD (sexually transmitted disease)  - CHLAMYDIA/GC PCR  - RPR  - HIV 1/2 AG/AB, 4TH GENERATION,W RFLX CONFIRM  - HEPATITIS PANEL, ACUTE    5. 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 - WA IMMUNIZ ADMIN,1 SINGLE/COMB VAC/TOXOID  - HUMAN PAPILLOMA VIRUS NONAVALENT HPV 3 DOSE IM (GARDASIL 9)       Medications Discontinued During This Encounter   Medication Reason    albuterol sulfate (PROAIR RESPICLICK) 90 mcg/actuation aepb     ondansetron (ZOFRAN ODT) 8 mg disintegrating tablet        Follow-up and Dispositions    · Return in about 1 year (around 1/31/2021) for CPE (30 min). Medication risks/benefits/costs/interactions/alternatives discussed with patient.   Advised patient to call back or return to office if symptoms worsen/change/persist. If patient cannot reach us or should anything more severe/urgent arise he/she should proceed directly to the nearest emergency department. Discussed expected course/resolution/complications of diagnosis in detail with patient. Patient given a written after visit summary which includes his/her diagnoses, current medications and vitals. Patient expressed understanding with the diagnosis and plan. Terrence Iniguez M.D.

## 2020-01-31 NOTE — PATIENT INSTRUCTIONS
Vaccine Information Statement HPV (Human Papillomavirus) Vaccine: What You Need to Know Many Vaccine Information Statements are available in [place] and other languages. See www.immunize.org/vis Hojas de información sobre vacunas están disponibles en español y en muchos otros idiomas. Visite www.immunize.org/vis 1. Why get vaccinated? HPV (Human papillomavirus) vaccine can prevent infection with some types of human papillomavirus. HPV infections can cause certain types of cancers including:  cervical, vaginal and vulvar cancers in women,  
 penile cancer in men, and 
 anal cancers in both men and women. HPV vaccine prevents infection from the HPV types that cause over 90% of these cancers. HPV is spread through intimate skin-to-skin or sexual contact. HPV infections are so common that nearly all men and women will get at least one type of HPV at some time in their lives. Most HPV infections go away by themselves within 2 years. But sometimes HPV infections will last longer and can cause cancers later in life. 2. HPV vaccine HPV vaccine is routinely recommended for adolescents at 6or 15years of age to ensure they are protected before they are exposed to the virus. HPV vaccine may be given beginning at age 5 years, and as late as age 39 years. Most people older than 26 years will not benefit from HPV vaccination. Talk with your health care provider if you want more information. Most children who get the first dose before 13years of age need 2 doses of HPV vaccine. Anyone who gets the first dose on or after 13years of age, and younger people with certain immunocompromising conditions, need 3 doses. Your health care provider can give you more information. HPV vaccine may be given at the same time as other vaccines. 3. Talk with your health care provider Tell your vaccine provider if the person getting the vaccine:  Has had an allergic reaction after a previous dose of HPV vaccine, or has any severe, life-threatening allergies.  Is pregnant. In some cases, your health care provider may decide to postpone HPV vaccination to a future visit. People with minor illnesses, such as a cold, may be vaccinated. People who are moderately or severely ill should usually wait until they recover before getting HPV vaccine. Your health care provider can give you more information. 4. Risks of a vaccine reaction  Soreness, redness, or swelling where the shot is given can happen after HPV vaccine.  Fever or headache can happen after HPV vaccine. People sometimes faint after medical procedures, including vaccination. Tell your provider if you feel dizzy or have vision changes or ringing in the ears. As with any medicine, there is a very remote chance of a vaccine causing a severe allergic reaction, other serious injury, or death. 5. What if there is a serious problem? An allergic reaction could occur after the vaccinated person leaves the clinic. If you see signs of a severe allergic reaction (hives, swelling of the face and throat, difficulty breathing, a fast heartbeat, dizziness, or weakness), call 9-1-1 and get the person to the nearest hospital. 
 
For other signs that concern you, call your health care provider. Adverse reactions should be reported to the Vaccine Adverse Event Reporting System (VAERS). Your health care provider will usually file this report, or you can do it yourself. Visit the VAERS website at www.vaers. hhs.gov or call 6-966.364.2485. VAERS is only for reporting reactions, and VAERS staff do not give medical advice. 6. The National Vaccine Injury Compensation Program 
 
The [facility] Vaccine Injury Compensation Program (VICP) is a federal program that was created to compensate people who may have been injured by certain vaccines.  Visit the VICP website at www.hrsa.gov/vaccinecompensation or call 9-434.850.6754 to learn about the program and about filing a claim. There is a time limit to file a claim for compensation. 7. How can I learn more?  Ask your health care provider.  Call your local or state health department.  Contact the Centers for Disease Control and Prevention (CDC): 
- Call 1-898.279.6632 (4-364-LAV-INFO) or 
- Visit CDCs website at www.cdc.gov/vaccines Vaccine Information Statement (Interim) HPV Vaccine 10/30/2019 
[address]arreal [id number]-15 Department of Health and [place] Centers for Disease Control and Prevention Office Use Only

## 2020-01-31 NOTE — PROGRESS NOTES
Chief Complaint   Patient presents with    New Patient     establish care        1. Have you been to the ER, urgent care clinic since your last visit? Hospitalized since your last visit? No    2. Have you seen or consulted any other health care providers outside of the [address] [place] [address] since your last visit? Include any pap smears or colon screening.  No

## 2020-10-05 ENCOUNTER — OFFICE VISIT (OUTPATIENT)
Dept: URGENT CARE | Age: 20
End: 2020-10-05

## 2020-10-18 ENCOUNTER — TELEPHONE (OUTPATIENT)
Dept: FAMILY MEDICINE CLINIC | Age: 20
End: 2020-10-18

## 2020-10-18 NOTE — TELEPHONE ENCOUNTER
----- Message from Emerson Bill sent at 10/16/2020  9:25 AM EDT -----  Regarding: Dr. Daniel Carrillo telephone  Appointment not available    Caller's first and last name and relationship to patient (if not the patient): Dora Mendez  Mother       Best contact number: [phone]       Preferred date and time: any Tuesday or Friday if possible       Scheduled appointment date and time:      Reason for appointment: Tetanus shot       Details to clarify the request: meghan Bill

## 2020-10-18 NOTE — TELEPHONE ENCOUNTER
Pt was last seen 1/31/20 for a cpe. Does he need a visit to receive this vaccination? Please advise.

## 2020-10-19 NOTE — TELEPHONE ENCOUNTER
Since we are not doing nurse visits at this time, he can go to his local pharmacy for Tetanus vaccine.

## 2020-10-20 NOTE — TELEPHONE ENCOUNTER
Outbound call to patients mom Edd Dodson. Left message for patients mom to return call back to the office.

## 2020-10-21 NOTE — TELEPHONE ENCOUNTER
Outgoing call to patients mom Althea Giordanosin who is on PHI.  Left message on mom personal voicemail that patient to go to local pharmacy for tetanus vaccine as office is currently not scheduling nurse visits at this time, and if any questions can give the office a call back at [phone]

## 2020-10-21 NOTE — TELEPHONE ENCOUNTER
----- Message from Juan Hernandez sent at 10/21/2020  9:33 AM EDT -----  Regarding: Dr. Aleyda Payne telephone  Patient return call    Caller's first and last name and relationship (if not the patient): Sourav Yates (mother)      Best contact number(s): [phone]          Whose call is being returned: nurse      Details to clarify the request: Tetanus  vaccine       Juan Hernandez

## 2021-07-31 ENCOUNTER — APPOINTMENT (OUTPATIENT)
Dept: GENERAL RADIOLOGY | Age: 21
End: 2021-07-31
Attending: EMERGENCY MEDICINE
Payer: COMMERCIAL

## 2021-07-31 ENCOUNTER — HOSPITAL ENCOUNTER (EMERGENCY)
Age: 21
Discharge: HOME | End: 2021-07-31
Attending: EMERGENCY MEDICINE
Payer: COMMERCIAL

## 2021-07-31 VITALS
RESPIRATION RATE: 18 BRPM | DIASTOLIC BLOOD PRESSURE: 61 MMHG | WEIGHT: 163.14 LBS | SYSTOLIC BLOOD PRESSURE: 131 MMHG | TEMPERATURE: 98.2 F | BODY MASS INDEX: 22.75 KG/M2 | HEART RATE: 70 BPM | OXYGEN SATURATION: 94 %

## 2021-07-31 DIAGNOSIS — R06.02 SOB (SHORTNESS OF BREATH): Primary | ICD-10-CM

## 2021-07-31 DIAGNOSIS — J45.901 ASTHMA WITH ACUTE EXACERBATION, UNSPECIFIED ASTHMA SEVERITY, UNSPECIFIED WHETHER PERSISTENT: ICD-10-CM

## 2021-07-31 LAB
ANION GAP SERPL CALC-SCNC: 8 MMOL/L (ref 5–15)
BASOPHILS # BLD: 0.1 K/UL (ref 0–0.1)
BASOPHILS NFR BLD: 1 % (ref 0–1)
BUN SERPL-MCNC: 9 MG/DL (ref 6–20)
BUN/CREAT SERPL: 9 (ref 12–20)
CALCIUM SERPL-MCNC: 9.2 MG/DL (ref 8.5–10.1)
CHLORIDE SERPL-SCNC: 107 MMOL/L (ref 97–108)
CO2 SERPL-SCNC: 23 MMOL/L (ref 21–32)
COMMENT, HOLDF: NORMAL
COVID-19 RAPID TEST, COVR: NOT DETECTED
CREAT SERPL-MCNC: 0.98 MG/DL (ref 0.7–1.3)
DIFFERENTIAL METHOD BLD: ABNORMAL
EOSINOPHIL # BLD: 0.3 K/UL (ref 0–0.4)
EOSINOPHIL NFR BLD: 3 % (ref 0–7)
ERYTHROCYTE [DISTWIDTH] IN BLOOD BY AUTOMATED COUNT: 12.1 % (ref 11.5–14.5)
GLUCOSE SERPL-MCNC: 104 MG/DL (ref 65–100)
HCT VFR BLD AUTO: 42 % (ref 36.6–50.3)
HGB BLD-MCNC: 14.1 G/DL (ref 12.1–17)
IMM GRANULOCYTES # BLD AUTO: 0 K/UL (ref 0–0.04)
IMM GRANULOCYTES NFR BLD AUTO: 0 % (ref 0–0.5)
LACTATE SERPL-SCNC: 2.9 MMOL/L (ref 0.4–2)
LYMPHOCYTES # BLD: 0.9 K/UL (ref 0.8–3.5)
LYMPHOCYTES NFR BLD: 10 % (ref 12–49)
MCH RBC QN AUTO: 30 PG (ref 26–34)
MCHC RBC AUTO-ENTMCNC: 33.6 G/DL (ref 30–36.5)
MCV RBC AUTO: 89.4 FL (ref 80–99)
MONOCYTES # BLD: 0.8 K/UL (ref 0–1)
MONOCYTES NFR BLD: 8 % (ref 5–13)
NEUTS SEG # BLD: 7.6 K/UL (ref 1.8–8)
NEUTS SEG NFR BLD: 78 % (ref 32–75)
NRBC # BLD: 0 K/UL (ref 0–0.01)
NRBC BLD-RTO: 0 PER 100 WBC
PLATELET # BLD AUTO: 200 K/UL (ref 150–400)
PMV BLD AUTO: 9.1 FL (ref 8.9–12.9)
POTASSIUM SERPL-SCNC: 3.5 MMOL/L (ref 3.5–5.1)
RBC # BLD AUTO: 4.7 M/UL (ref 4.1–5.7)
SAMPLES BEING HELD,HOLD: NORMAL
SARS-COV-2, COV2: NORMAL
SARS-COV-2, XPLCVT: NOT DETECTED
SODIUM SERPL-SCNC: 138 MMOL/L (ref 136–145)
SOURCE, COVRS: NORMAL
SOURCE, COVRS: NORMAL
WBC # BLD AUTO: 9.7 K/UL (ref 4.1–11.1)

## 2021-07-31 PROCEDURE — 77030012341 HC CHMB SPCR OPTC MDI VYRM -A

## 2021-07-31 PROCEDURE — 96360 HYDRATION IV INFUSION INIT: CPT

## 2021-07-31 PROCEDURE — 94640 AIRWAY INHALATION TREATMENT: CPT

## 2021-07-31 PROCEDURE — 80048 BASIC METABOLIC PNL TOTAL CA: CPT

## 2021-07-31 PROCEDURE — U0005 INFEC AGEN DETEC AMPLI PROBE: HCPCS

## 2021-07-31 PROCEDURE — 87635 SARS-COV-2 COVID-19 AMP PRB: CPT

## 2021-07-31 PROCEDURE — 99284 EMERGENCY DEPT VISIT MOD MDM: CPT

## 2021-07-31 PROCEDURE — 83605 ASSAY OF LACTIC ACID: CPT

## 2021-07-31 PROCEDURE — 74011250637 HC RX REV CODE- 250/637: Performed by: EMERGENCY MEDICINE

## 2021-07-31 PROCEDURE — 85025 COMPLETE CBC W/AUTO DIFF WBC: CPT

## 2021-07-31 PROCEDURE — 74011250636 HC RX REV CODE- 250/636: Performed by: EMERGENCY MEDICINE

## 2021-07-31 PROCEDURE — 74011636637 HC RX REV CODE- 636/637: Performed by: EMERGENCY MEDICINE

## 2021-07-31 PROCEDURE — 71045 X-RAY EXAM CHEST 1 VIEW: CPT

## 2021-07-31 PROCEDURE — 36415 COLL VENOUS BLD VENIPUNCTURE: CPT

## 2021-07-31 RX ORDER — ALBUTEROL SULFATE 90 UG/1
2 AEROSOL, METERED RESPIRATORY (INHALATION)
Qty: 1 INHALER | Refills: 0 | Status: SHIPPED | OUTPATIENT
Start: 2021-07-31

## 2021-07-31 RX ORDER — PREDNISONE 20 MG/1
60 TABLET ORAL
Status: COMPLETED | OUTPATIENT
Start: 2021-07-31 | End: 2021-07-31

## 2021-07-31 RX ORDER — ALBUTEROL SULFATE 90 UG/1
6 AEROSOL, METERED RESPIRATORY (INHALATION)
Status: COMPLETED | OUTPATIENT
Start: 2021-07-31 | End: 2021-07-31

## 2021-07-31 RX ORDER — PREDNISONE 20 MG/1
40 TABLET ORAL
Qty: 10 TABLET | Refills: 0 | Status: SHIPPED | OUTPATIENT
Start: 2021-07-31 | End: 2021-08-05

## 2021-07-31 RX ORDER — ALBUTEROL SULFATE 0.83 MG/ML
2.5 SOLUTION RESPIRATORY (INHALATION)
COMMUNITY
End: 2021-09-14

## 2021-07-31 RX ORDER — ALBUTEROL SULFATE 90 UG/1
6 AEROSOL, METERED RESPIRATORY (INHALATION)
Status: DISCONTINUED | OUTPATIENT
Start: 2021-07-31 | End: 2021-07-31 | Stop reason: HOSPADM

## 2021-07-31 RX ADMIN — ALBUTEROL SULFATE 6 PUFF: 90 AEROSOL, METERED RESPIRATORY (INHALATION) at 05:19

## 2021-07-31 RX ADMIN — ALBUTEROL SULFATE 6 PUFF: 90 AEROSOL, METERED RESPIRATORY (INHALATION) at 05:55

## 2021-07-31 RX ADMIN — PREDNISONE 60 MG: 20 TABLET ORAL at 04:53

## 2021-07-31 RX ADMIN — SODIUM CHLORIDE 1000 ML: 9 INJECTION, SOLUTION INTRAVENOUS at 06:58

## 2021-07-31 RX ADMIN — ALBUTEROL SULFATE 6 PUFF: 90 AEROSOL, METERED RESPIRATORY (INHALATION) at 05:36

## 2021-07-31 NOTE — ED PROVIDER NOTES
24-year-old male presenting ER with report of shortness of breath chest tightness URI-like symptoms and myalgias with chills and fatigue. Patient reports symptoms started 1 to 2 days ago. Denies any sick contacts. Has history of childhood asthma normally well controlled but had to use his inhaler several times a day. Patient denies any fever but having chills. Muscle aches. No nausea vomiting or diarrhea. No chest pain only as mild tightness. Shortness of breath with wheezing. No loss of taste or smell. 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, IgG & Nuclear Antigen Ab, IgG       Past Surgical History:   Procedure Laterality Date    HX TONSILLECTOMY  06/2019    HX WISDOM TEETH EXTRACTION  05/2017         Family History:   Problem Relation Age of Onset    No Known Problems Mother     No Known Problems Father     No Known Problems Sister     No Known Problems Brother        Social History     Socioeconomic History    Marital status: SINGLE     Spouse name: Not on file    Number of children: Not on file    Years of education: Not on file    Highest education level: Not on file   Occupational History    Not on file   Tobacco Use    Smoking status: Never Smoker    Smokeless tobacco: Never Used   Substance and Sexual Activity    Alcohol use: Yes     Comment: occ    Drug use: No    Sexual activity: Yes     Partners: Female     Birth control/protection: Condom   Other Topics Concern    Not on file   Social History Narrative    ** Merged History Encounter **          Social Determinants of Health     Financial Resource Strain:     Difficulty of Paying Living Expenses:    Food Insecurity:     Worried About Running Out of Food in the Last Year:     Ran Out of Food in the Last Year:    Transportation Needs:     Lack of Transportation (Medical):      Lack of Transportation (Non-Medical): Physical Activity:     Days of Exercise per Week:     Minutes of Exercise per Session:    Stress:     Feeling of Stress :    Social Connections:     Frequency of Communication with Friends and Family:     Frequency of Social Gatherings with Friends and Family:     Attends [employer] Services:     Active Member of Clubs or Organizations:     Attends Club or Organization Meetings:     Marital Status:    Intimate Partner Violence:     Fear of Current or Ex-Partner:     Emotionally Abused:     Physically Abused:     Sexually Abused: ALLERGIES: Patient has no known allergies. Review of Systems   Constitutional: Positive for chills and fatigue. Negative for fever. HENT: Positive for congestion. Negative for sore throat. Eyes: Negative for pain. Respiratory: Positive for chest tightness, shortness of breath and wheezing. Cardiovascular: Negative for chest pain. Gastrointestinal: Negative for abdominal pain, diarrhea, nausea and vomiting. Genitourinary: Negative for dysuria and flank pain. Musculoskeletal: Positive for myalgias. Negative for back pain and neck pain. Skin: Negative for rash. Neurological: Negative for dizziness and headaches. Vitals:    07/31/21 0519 07/31/21 0530 07/31/21 0536 07/31/21 0555   BP:       Pulse:       Temp:       SpO2: 94% 91% 91% 96%   Weight:                Physical Exam  Constitutional:       Appearance: He is well-developed. HENT:      Head: Normocephalic. Eyes:      Conjunctiva/sclera: Conjunctivae normal.   Cardiovascular:      Rate and Rhythm: Normal rate and regular rhythm. Pulmonary:      Effort: Pulmonary effort is normal. Tachypnea present. No respiratory distress. Breath sounds: Wheezing present. Abdominal:      General: Bowel sounds are normal.      Palpations: Abdomen is soft. Tenderness: There is no abdominal tenderness. Musculoskeletal:         General: Normal range of motion.       Cervical back: Normal range of motion and neck supple. Skin:     General: Skin is warm. Capillary Refill: Capillary refill takes less than 2 seconds. Findings: No rash. Neurological:      Mental Status: He is alert and oriented to person, place, and time. Comments: No gross motor or sensory deficits          MDM  Number of Diagnoses or Management Options  Asthma with acute exacerbation, unspecified asthma severity, unspecified whether persistent  SOB (shortness of breath)  Diagnosis management comments: Patient presenting ER with URI-like symptoms chills, body aches. Worsening shortness of breath and increased respiratory rate. Patient had diffuse wheezing. As receiving 3 rounds of 6 puffs of albuterol patient continues to have significant wheezing and shortness of breath. .  Rapid Covid test negative. Chest x-ray no signs of pneumonia. In light of continued shortness of breath and wheezing will admit for asthma exacerbation. 7:24 AM  Just reassessed the patient. Patient reports feeling significantly better. Reassess lungs wheezing significantly improved. We will send patient home with steroids and albuterol. Total critical care time spent exclusive of procedures:  40 min.           Amount and/or Complexity of Data Reviewed  Clinical lab tests: reviewed  Tests in the radiology section of CPT®: reviewed      ED Course as of Jul 31 0716   Sat Jul 31, 2021   0707 COVID-19 rapid test: Not detected [ZD]   0715 Likely secondary to albuterol administration   Lactic acid(!!): 2.9 [ZD]      ED Course User Index  [ZD] Osiris Merlos MD       Procedures      Recent Results (from the past 24 hour(s))   SARS-COV-2    Collection Time: 07/31/21  5:17 AM   Result Value Ref Range    SARS-CoV-2 Please find results under separate order     COVID-19 RAPID TEST    Collection Time: 07/31/21  6:16 AM   Result Value Ref Range    Specimen source Nasopharyngeal      COVID-19 rapid test Not detected NOTD     CBC WITH AUTOMATED DIFF    Collection Time: 07/31/21  6:31 AM   Result Value Ref Range    WBC 9.7 4.1 - 11.1 K/uL    RBC 4.70 4. 10 - 5.70 M/uL    HGB 14.1 12.1 - 17.0 g/dL    HCT 42.0 36.6 - 50.3 %    MCV 89.4 80.0 - 99.0 FL    MCH 30.0 26.0 - 34.0 PG    MCHC 33.6 30.0 - 36.5 g/dL    RDW 12.1 11.5 - 14.5 %    PLATELET 295 212 - 456 K/uL    MPV 9.1 8.9 - 12.9 FL    NRBC 0.0 0  WBC    ABSOLUTE NRBC 0.00 0.00 - 0.01 K/uL    NEUTROPHILS 78 (H) 32 - 75 %    LYMPHOCYTES 10 (L) 12 - 49 %    MONOCYTES 8 5 - 13 %    EOSINOPHILS 3 0 - 7 %    BASOPHILS 1 0 - 1 %    IMMATURE GRANULOCYTES 0 0.0 - 0.5 %    ABS. NEUTROPHILS 7.6 1.8 - 8.0 K/UL    ABS. LYMPHOCYTES 0.9 0.8 - 3.5 K/UL    ABS. MONOCYTES 0.8 0.0 - 1.0 K/UL    ABS. EOSINOPHILS 0.3 0.0 - 0.4 K/UL    ABS. BASOPHILS 0.1 0.0 - 0.1 K/UL    ABS. IMM. GRANS. 0.0 0.00 - 0.04 K/UL    DF AUTOMATED     METABOLIC PANEL, BASIC    Collection Time: 07/31/21  6:31 AM   Result Value Ref Range    Sodium 138 136 - 145 mmol/L    Potassium 3.5 3.5 - 5.1 mmol/L    Chloride 107 97 - 108 mmol/L    CO2 23 21 - 32 mmol/L    Anion gap 8 5 - 15 mmol/L    Glucose 104 (H) 65 - 100 mg/dL    BUN 9 6 - 20 MG/DL    Creatinine 0.98 0.70 - 1.30 MG/DL    BUN/Creatinine ratio 9 (L) 12 - 20      GFR est AA >60 >60 ml/min/1.73m2    GFR est non-AA >60 >60 ml/min/1.73m2    Calcium 9.2 8.5 - 10.1 MG/DL   SAMPLES BEING HELD    Collection Time: 07/31/21  6:33 AM   Result Value Ref Range    SAMPLES BEING HELD 1RED,1LAV,1PST     COMMENT        Add-on orders for these samples will be processed based on acceptable specimen integrity and analyte stability, which may vary by analyte. XR CHEST PORT    Result Date: 7/31/2021  Clinical history: sob INDICATION:   sob COMPARISON: 5/18/2019 FINDINGS: AP portable upright view of the chest demonstrates a stable  cardiopericardial silhouette. There is no pleural effusion. .There is no focal consolidation. .There is no pneumothorax. .     No acute process identified.

## 2021-07-31 NOTE — ED NOTES
Pt discharged home with parent/guardian. Pt acting age appropriately, respirations regular and unlabored, cap refill less than two seconds. Skin pink, dry and warm. Lungs clear bilaterally. No further complaints at this time. Parent/guardian verbalized understanding of discharge paperwork and has no further questions at this time. Education provided about continuation of care, follow up care and medication administration: albuterol and prednisone. Parent/guardian able to provided teach back about discharge instructions.

## 2021-07-31 NOTE — ED TRIAGE NOTES
Triage: 2 days coughing and shortness of breath. Nasal congestion.   Used nebulizer at 0100  No fevers

## 2021-08-02 ENCOUNTER — PATIENT OUTREACH (OUTPATIENT)
Dept: CASE MANAGEMENT | Age: 21
End: 2021-08-02

## 2021-08-02 NOTE — PROGRESS NOTES
ACM contacted patient for ER follow up . Patient verified identity using name/. Patient reports is taking albuterol inhaler and Prednisone. Since starting prescriptions he is breathing a lot better . No wheezing noted. Patient reports currently does not see a pulmonary doctor. Assisted to help schedule a PCP follow up. AC provided contact info for ACM , ACM will follow up for CM assistance with asthma in 1-2 weeks. Ambulatory Care Management Note    Date/Time:  2021 10:26 AM    This patient was received as a referral from Daily assignment. Ambulatory Care Manager outreached to patient today to offer care management services. Introduction to self and role of care manager provided. Patient accepted care management services at this time. Follow up call scheduled at this time. Patient has Ambulatory Care Manager's contact number for for any questions or concerns.

## 2021-09-14 ENCOUNTER — VIRTUAL VISIT (OUTPATIENT)
Dept: FAMILY MEDICINE CLINIC | Age: 21
End: 2021-09-14
Payer: COMMERCIAL

## 2021-09-14 DIAGNOSIS — J45.20 MILD INTERMITTENT ASTHMA WITHOUT COMPLICATION: Primary | ICD-10-CM

## 2021-09-14 PROCEDURE — 99214 OFFICE O/P EST MOD 30 MIN: CPT | Performed by: FAMILY MEDICINE

## 2021-09-14 RX ORDER — FLUTICASONE PROPIONATE 44 UG/1
2 AEROSOL, METERED RESPIRATORY (INHALATION) 2 TIMES DAILY
Qty: 1 EACH | Refills: 3 | Status: SHIPPED | OUTPATIENT
Start: 2021-09-14 | End: 2022-04-28 | Stop reason: ALTCHOICE

## 2021-09-14 NOTE — PROGRESS NOTES
Cordell Rojas, who was evaluated through a synchronous (real-time) audio-video encounter, and/or his healthcare decision maker, is aware that it is a billable service, with coverage as determined by his insurance carrier. He provided verbal consent to proceed: YES, and patient identification was verified. It was conducted pursuant to the emergency declaration under the [facility], [facility] authority and the [employer] Act. A caregiver was present when appropriate. Ability to conduct physical exam was limited. I was at home. The patient was at home. This virtual visit was conducted via [employer]. Pursuant to the emergency declaration under the [facility], [address] [place] [address] authority and the [employer] Act, this Virtual  Visit was conducted to reduce the patient's risk of exposure to COVID-19 and provide continuity of care for an established patient. Services were provided through a video synchronous discussion virtually to substitute for in-person clinic visit. Due to this being a TeleHealth evaluation, many elements of the physical examination are unable to be assessed. Total Time: minutes: 5-10 minutes. Arash Laureano MD    [phone]  Subjective:   Cordell Rojas was seen for:    Was seen in ER on 7/31 for asthma exacerbation. COVID negative and CXR was normal. Discharged with prednisone and albuterol. Since then, still had coughing several times per day. Using prn albuterol once or twice per week. Hx of childhood asthma and seasonal allergies. Does not smoke.       Current Outpatient Medications:     albuterol (PROVENTIL VENTOLIN) 2.5 mg /3 mL (0.083 %) nebu, 2.5 mg by Nebulization route., Disp: , Rfl:     albuterol (PROVENTIL HFA, VENTOLIN HFA, PROAIR HFA) 90 mcg/actuation inhaler, Take 2 Puffs by inhalation every six (6) hours as needed for Wheezing., Disp: 1 Inhaler, Rfl: 0    No Known Allergies    Review of Systems   Constitutional: Negative for fever, malaise/fatigue and weight loss. Respiratory: Negative for hemoptysis, shortness of breath and wheezing. Cardiovascular: Negative for chest pain, palpitations, leg swelling and PND. Gastrointestinal: Negative for abdominal pain, constipation, diarrhea, nausea and vomiting. Physical Exam:     No flowsheet data found. General: alert, cooperative, no distress   Mental  status: normal mood, behavior, speech, dress, motor activity, and thought processes, able to follow commands   HENT: NCAT   Neck: no visualized mass   Resp: no respiratory distress   Neuro: no gross deficits   Skin: no discoloration or lesions of concern on visible areas   Psychiatric: normal affect, consistent with stated mood, no evidence of hallucinations       Assessment & Plan:     Ivan Rivera is a 23 y.o. male who presents today for:    1. Mild intermittent asthma without complication  Add on ICS. - fluticasone propionate (Flovent HFA) 44 mcg/actuation inhaler; Take 2 Puffs by inhalation two (2) times a day. Dispense: 1 Each; Refill: 3       Medications Discontinued During This Encounter   Medication Reason    albuterol (PROVENTIL VENTOLIN) 2.5 mg /3 mL (0.083 %) nebu LIST CLEANUP     Follow-up and Dispositions    · Return in about 6 weeks (around 10/26/2021) for Medication Check. Treatment risks/benefits/costs/interactions/alternatives discussed with patient. Advised patient to call back or return to office if symptoms worsen/change/persist. If patient cannot reach us or should anything more severe/urgent arise he/she should proceed directly to the nearest emergency department. Discussed expected course/resolution/complications of diagnosis in detail with patient. Patient expressed understanding with the diagnosis and plan. Terrence Antonio M.D.

## 2021-09-22 ENCOUNTER — PATIENT OUTREACH (OUTPATIENT)
Dept: CASE MANAGEMENT | Age: 21
End: 2021-09-22

## 2021-09-22 NOTE — PROGRESS NOTES
ACM contacted patient for follow up . Patient verified name / . Patient reports he is doing much better with asthma. Patient notes he has no other CM needs at this time and denies the need for further CM care. 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 Patient/family has the ability to self-manage at this time. Care management goals have been completed. No further Ambulatory Care Manager follow up scheduled. Goals Addressed    None         Patient has Ambulatory Care Manager's contact information for any further questions, concerns, or needs. Patients upcoming visits:  No future appointments.

## 2022-04-27 ENCOUNTER — NURSE TRIAGE (OUTPATIENT)
Dept: OTHER | Facility: CLINIC | Age: 22
End: 2022-04-27

## 2022-04-27 NOTE — TELEPHONE ENCOUNTER
Received call from Sujatha  at [facility] with Red Flag Complaint. Subjective: Caller states \" I have some temporary pain in my jaw- when I chew I can hear clicking. The right side of my face is a little swollen - a little puffy -nothing serious. \"      Current Symptoms:   +able to eat and drink   -denies tooth pain   -denies shortness of breath   -denies sores in mouth     Onset: 2 months ago; unchanged    Associated Symptoms: NA    Pain Severity: 5/10; aching; intermittent- right jaw pain     Temperature: Denies     What has been tried: none     Recommended disposition: See PCP within 24 Hours    Care advice provided, patient verbalizes understanding; denies any other questions or concerns; instructed to call back for any new or worsening symptoms. Patient/Caller agrees with recommended disposition; writer provided warm transfer to [facility]  at [facility] for appointment scheduling    Attention Provider: Thank you for allowing me to participate in the care of your patient. The patient was connected to triage in response to information provided to the ECC. Please do not respond through this encounter as the response is not directed to a shared pool.       Reason for Disposition   Large lymph node (> 1 inch or 2.5 cm) under the jaw    Protocols used: MOUTH PAIN-ADULT-AH

## 2022-04-28 ENCOUNTER — OFFICE VISIT (OUTPATIENT)
Dept: FAMILY MEDICINE CLINIC | Age: 22
End: 2022-04-28
Payer: COMMERCIAL

## 2022-04-28 VITALS
DIASTOLIC BLOOD PRESSURE: 75 MMHG | HEART RATE: 64 BPM | OXYGEN SATURATION: 100 % | HEIGHT: 71 IN | TEMPERATURE: 98.2 F | BODY MASS INDEX: 23.32 KG/M2 | WEIGHT: 166.6 LBS | SYSTOLIC BLOOD PRESSURE: 121 MMHG | RESPIRATION RATE: 16 BRPM

## 2022-04-28 DIAGNOSIS — M26.609 TMJ (TEMPOROMANDIBULAR JOINT SYNDROME): Primary | ICD-10-CM

## 2022-04-28 PROCEDURE — 99213 OFFICE O/P EST LOW 20 MIN: CPT | Performed by: NURSE PRACTITIONER

## 2022-04-28 RX ORDER — LIDOCAINE HYDROCHLORIDE 20 MG/ML
SOLUTION OROPHARYNGEAL
COMMUNITY
End: 2022-04-28 | Stop reason: ALTCHOICE

## 2022-04-28 RX ORDER — PREDNISONE 10 MG/1
TABLET ORAL
COMMUNITY
Start: 2022-04-05 | End: 2022-04-28 | Stop reason: ALTCHOICE

## 2022-04-28 RX ORDER — METHOCARBAMOL 750 MG/1
TABLET, FILM COATED ORAL
COMMUNITY
Start: 2022-03-11

## 2022-04-28 RX ORDER — DICLOFENAC SODIUM 75 MG/1
TABLET, DELAYED RELEASE ORAL
COMMUNITY
Start: 2022-03-11 | End: 2022-04-28 | Stop reason: ALTCHOICE

## 2022-04-28 NOTE — PROGRESS NOTES
HISTORY OF PRESENT ILLNESS  Lyndal Sacks is a 19 y.o. male. HPI  C/o bilateral jaw pain on and off for several months. Symptoms aggravated yesterday. Denies any gum chewing or eating chewy/tough foods. C/o discomfort and clicking on right, shifting on left. No known history of bruxism. Took 800 mg ibuprofen with some temporary sx relief. 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 (Patient not taking: Reported on 4/28/2022)    albuterol (PROVENTIL HFA, VENTOLIN HFA, PROAIR HFA) 90 mcg/actuation inhaler Take 2 Puffs by inhalation every six (6) hours as needed for Wheezing. (Patient not taking: Reported on 4/28/2022)     No current facility-administered medications for this visit. Social History     Tobacco Use    Smoking status: Never Smoker    Smokeless tobacco: Never Used   Vaping Use    Vaping Use: Never used   Substance Use Topics    Alcohol use: Yes     Comment: occ    Drug use: No     Blood pressure 121/75, pulse 64, temperature 98.2 °F (36.8 °C), temperature source Temporal, resp. rate 16, height 5' 11\" (1.803 m), weight 166 lb 9.6 oz (75.6 kg), SpO2 100 %. Review of Systems   Musculoskeletal: Positive for joint pain (as stated HPI). Negative for neck pain. All other systems reviewed and are negative. Physical Exam  Constitutional:       General: He is not in acute distress. HENT:      Head:      Jaw: No trismus, tenderness, swelling or pain on movement. Cardiovascular:      Rate and Rhythm: Normal rate and regular rhythm. Heart sounds: Normal heart sounds. Pulmonary:      Effort: Pulmonary effort is normal.      Breath sounds: Normal breath sounds. Musculoskeletal:      Cervical back: Neck supple.    Lymphadenopathy:      Cervical: No cervical adenopathy. Skin:     General: Skin is warm and dry. Neurological:      Mental Status: He is alert. ASSESSMENT and PLAN  Diagnoses and all orders for this visit:    1. TMJ (temporomandibular joint syndrome)    May use ibuprofen as directed. Avoid aggravating foods. Avoid gum chewing. Soft diet until symptoms improve. Avoid clenching jaw. Follow up with dentist for possible bruxism. ENT referral if sx worsen or FTI. We discussed the expected course, resolution and complications of the diagnosis in detail. Medication risks, benefits, costs, interactions and side effects were discussed. The patient was advised to contact the office for worsening of condition or FTI as anticipated. The patient expressed understanding of the diagnosis and plan.

## 2022-04-28 NOTE — PROGRESS NOTES
Chief Complaint   Patient presents with    Jaw Pain     1. Have you been to the ER, urgent care clinic since your last visit? Hospitalized since your last visit? No    2. Have you seen or consulted any other health care providers outside of the [address] [place] [address] since your last visit? Include any pap smears or colon screening.  No

## 2022-04-28 NOTE — PATIENT INSTRUCTIONS
Temporomandibular Disorder: Care Instructions  Overview     Temporomandibular disorders (TMDs) are problems with the muscles and joints that connect your jaw to your skull. The disorders cause pain when you talk, chew, swallow, or yawn. You may feel this pain on one or both sides. TMDs are often caused by tight jaw muscles. The tightness can be caused by clenching or grinding your teeth. This may happen when you have a lot of stress in your life. If you lower your stress, you may be able to stop clenching or grinding your teeth. This will help relax your jaw and reduce your pain. Your doctor may suggest a dental splint. Splints can help reduce teeth grinding and clenching. You may also be able to do some things at home to feel better. But if none of this works, your doctor may prescribe medicine to help relax your muscles and control the pain. Follow-up care is a key part of your treatment and safety. Be sure to make and go to all appointments, and call your doctor if you are having problems. It's also a good idea to know your test results and keep a list of the medicines you take. How can you care for yourself at home? · Put either an ice pack or a warm, moist cloth on your jaw for 15 minutes several times a day. You can try switching back and forth between moist heat and cold. · Make eating easy on your jaw. Choose softer foods that are easy to chew like eggs, yogurt, or soup. Avoid hard foods that cause your jaws to work very hard. Try cutting your food into small pieces. And if your jaw gets too painful to chew, or if it locks, you may need to puree your food for a while. · To relax your jaw, repeat this exercise for a few minutes every morning and evening. Watch yourself in a mirror. Gently open and close your mouth. Move your jaw straight up and down. But don't do this if it makes your pain worse. · Manage stress. You may be clenching or tightening your muscles when you are under stress.   · Get at least 30 minutes of exercise on most days of the week to relieve stress. Walking is a good choice. · Ask your doctor if you can take an over-the-counter pain medicine, such as acetaminophen (Tylenol), ibuprofen (Advil, Motrin), or naproxen (Aleve). Be safe with medicines. Read and follow all instructions on the label. · Use good posture for sitting and standing. Slumping your shoulders disturbs the alignment of your facial bones and muscles. · Don't:  ? Hold a phone between your shoulder and your jaw. ? Open your mouth all the way, like when you sing loudly or yawn. ? Clench or grind your teeth, bite your lips, or chew your fingernails. ? Clench things such as pens, pipes, or cigars between your teeth. When should you call for help? Call your doctor now or seek immediate medical care if:    · Your jaw is locked open or shut or it is hard to move your jaw. Watch closely for changes in your health, and be sure to contact your doctor if:    · Your jaw pain gets worse.     · Your face is swollen.     · You do not get better as expected. Where can you learn more? Go to http://www.gray.com/  Enter P868 in the search box to learn more about \"Temporomandibular Disorder: Care Instructions. \"  Current as of: June 30, 2021               Content Version: 13.2  © 9177-7356 [employer]. Care instructions adapted under license by [employer] (which disclaims liability or warranty for this information). If you have questions about a medical condition or this instruction, always ask your healthcare professional. [place] [address] any warranty or liability for your use of this information.

## 2022-11-10 ENCOUNTER — OFFICE VISIT (OUTPATIENT)
Dept: FAMILY MEDICINE CLINIC | Age: 22
End: 2022-11-10
Payer: COMMERCIAL

## 2022-11-10 VITALS
HEART RATE: 76 BPM | SYSTOLIC BLOOD PRESSURE: 122 MMHG | OXYGEN SATURATION: 98 % | DIASTOLIC BLOOD PRESSURE: 62 MMHG | TEMPERATURE: 97.7 F | HEIGHT: 71 IN | RESPIRATION RATE: 16 BRPM | WEIGHT: 164 LBS | BODY MASS INDEX: 22.96 KG/M2

## 2022-11-10 DIAGNOSIS — Z11.59 NEED FOR HEPATITIS C SCREENING TEST: ICD-10-CM

## 2022-11-10 DIAGNOSIS — R61 NIGHT SWEATS: Primary | ICD-10-CM

## 2022-11-10 DIAGNOSIS — Z11.4 SCREENING FOR HIV WITHOUT PRESENCE OF RISK FACTORS: ICD-10-CM

## 2022-11-10 PROCEDURE — 99214 OFFICE O/P EST MOD 30 MIN: CPT | Performed by: FAMILY MEDICINE

## 2022-11-10 NOTE — PROGRESS NOTES
Patient Name: Salma Rob   MRN: [id number]    Yue Collins is a 19 y.o. male who presents with the following:     Patient had an upper respiratory infection about a month ago in which he went to urgent care. They tested him for COVID, flu, and strep which were all negative. He was told it was a viral illness. He subsequently had 2 and 1/2 weeks of nightly sweats with temperatures up to 100 Fahrenheit. It finally stopped 2 or 3 days ago. Denies any drug use, tobacco use, alcohol use. Is sexually active. Reports that there is a family history of thyroid disease in his sister, mother, and grandmother. Wt Readings from Last 3 Encounters:   11/10/22 164 lb (74.4 kg)   04/28/22 166 lb 9.6 oz (75.6 kg)   07/31/21 163 lb 2.3 oz (74 kg)       Review of Systems   Constitutional:  Positive for diaphoresis. Negative for fever, malaise/fatigue and weight loss. Respiratory:  Negative for cough, hemoptysis, shortness of breath and wheezing. Cardiovascular:  Negative for chest pain, palpitations, leg swelling and PND. Gastrointestinal:  Negative for abdominal pain, constipation, diarrhea, nausea and vomiting. The patient's medications, allergies, past medical history, surgical history, family history and social history were reviewed and updated where appropriate. No current outpatient medications on file. No Known Allergies      OBJECTIVE    Visit Vitals  /62 (BP 1 Location: Left upper arm, BP Patient Position: Sitting, BP Cuff Size: Adult)   Pulse 76   Temp 97.7 °F (36.5 °C) (Temporal)   Resp 16   Ht 5' 11\" (1.803 m)   Wt 164 lb (74.4 kg)   SpO2 98%   BMI 22.87 kg/m²       Physical Exam  Vitals and nursing note reviewed. Constitutional:       General: He is not in acute distress. Appearance: He is not diaphoretic. Eyes:      Conjunctiva/sclera: Conjunctivae normal.      Pupils: Pupils are equal, round, and reactive to light.    Cardiovascular:      Rate and Rhythm: Normal rate and regular rhythm. Heart sounds: Normal heart sounds. No murmur heard. No friction rub. No gallop. Pulmonary:      Effort: Pulmonary effort is normal. No respiratory distress. Breath sounds: Normal breath sounds. No wheezing. Lymphadenopathy:      Cervical: No cervical adenopathy. Skin:     General: Skin is warm and dry. Neurological:      Mental Status: He is alert. ASSESSMENT AND PLAN  Cory Javier is a 19 y.o. male who presents today for:    1. Night sweats  Unclear etiology; possible post viral. Will obtain labs and CXR as below. Continue to monitor.  - CBC WITH AUTOMATED DIFF; Future  - METABOLIC PANEL, COMPREHENSIVE; Future  - HIV 1/2 AG/AB, 4TH GENERATION,W RFLX CONFIRM; Future  - TSH 3RD GENERATION; Future  - T4 (THYROXINE); Future  - QUANTIFERON-TB GOLD PLUS; Future  - XR CHEST PA LAT; Future  - HEPATITIS C AB; Future  - C REACTIVE PROTEIN, QT; Future  - HEPATITIS C AB  - CBC WITH AUTOMATED DIFF  - METABOLIC PANEL, COMPREHENSIVE  - HIV 1/2 AG/AB, 4TH GENERATION,W RFLX CONFIRM  - TSH 3RD GENERATION  - T4 (THYROXINE)  - QUANTIFERON-TB GOLD PLUS  - C REACTIVE PROTEIN, QT    2. Need for hepatitis C screening test  - HEPATITIS C AB; Future  - HEPATITIS C AB    3. Screening for HIV without presence of risk factors  - HIV 1/2 AG/AB, 4TH GENERATION,W RFLX CONFIRM; Future  - HIV 1/2 AG/AB, 4TH GENERATION,W RFLX CONFIRM       Medications Discontinued During This Encounter   Medication Reason    methocarbamoL (ROBAXIN) 750 mg tablet LIST CLEANUP    albuterol (PROVENTIL HFA, VENTOLIN HFA, PROAIR HFA) 90 mcg/actuation inhaler LIST CLEANUP           Treatment risks/benefits/costs/interactions/alternatives discussed with patient. Advised patient to call back or return to office if symptoms worsen/change/persist. If patient cannot reach us or should anything more severe/urgent arise he/she should proceed directly to the nearest emergency department.   Discussed expected course/resolution/complications of diagnosis in detail with patient. Patient expressed understanding with the diagnosis and plan. This dictation may have been completed with Dragon, the computer voice recognition software. Unanticipated grammatical, syntax, homophones, and other interpretive errors are sometimes inadvertently transcribed by the computer software. Please disregard any errors that have escaped final proofreading. Terrence Lynn M.D.

## 2022-11-10 NOTE — PROGRESS NOTES
Chief Complaint   Patient presents with    Sweats     Patient started experiencing night sweats 4-5 weeks ago when he was sick; after recovering, patient is still waking up in the middle of the night drenched in sweat; patient reported thyroid problem family history (mom, sister, maternal grandmother)     Patient also reports random events of sweating and feeling hot throughout the day        1. \"Have you been to the ER, urgent care clinic since your last visit? Hospitalized since your last visit? \" Yes When: 10/22 Where: [facility] Reason for visit: stomach bug, told to treat sx, no abox given    2. \"Have you seen or consulted any other health care providers outside of the [address] [place] [address] since your last visit? \" No     3. For patients aged 39-70: Has the patient had a colonoscopy / FIT/ Cologuard? NA - based on age      If the patient is female:    4. For patients aged 41-77: Has the patient had a mammogram within the past 2 years? NA - based on age or sex      11. For patients aged 21-65: Has the patient had a pap smear?  NA - based on age or sex    3 most recent PHQ Screens 11/10/2022   Little interest or pleasure in doing things Not at all   Feeling down, depressed, irritable, or hopeless Not at all   Total Score PHQ 2 0

## 2022-11-11 LAB
ALBUMIN SERPL-MCNC: 4.5 G/DL (ref 3.5–5)
ALBUMIN/GLOB SERPL: 1.7 {RATIO} (ref 1.1–2.2)
ALP SERPL-CCNC: 52 U/L (ref 45–117)
ALT SERPL-CCNC: 32 U/L (ref 12–78)
ANION GAP SERPL CALC-SCNC: 4 MMOL/L (ref 5–15)
AST SERPL-CCNC: 21 U/L (ref 15–37)
BASOPHILS # BLD: 0.1 K/UL (ref 0–0.1)
BASOPHILS NFR BLD: 1 % (ref 0–1)
BILIRUB SERPL-MCNC: 0.9 MG/DL (ref 0.2–1)
BUN SERPL-MCNC: 17 MG/DL (ref 6–20)
BUN/CREAT SERPL: 17 (ref 12–20)
CALCIUM SERPL-MCNC: 9.5 MG/DL (ref 8.5–10.1)
CHLORIDE SERPL-SCNC: 103 MMOL/L (ref 97–108)
CO2 SERPL-SCNC: 28 MMOL/L (ref 21–32)
CREAT SERPL-MCNC: 1.01 MG/DL (ref 0.7–1.3)
CRP SERPL-MCNC: <0.29 MG/DL (ref 0–0.6)
DIFFERENTIAL METHOD BLD: NORMAL
EOSINOPHIL # BLD: 0.4 K/UL (ref 0–0.4)
EOSINOPHIL NFR BLD: 6 % (ref 0–7)
ERYTHROCYTE [DISTWIDTH] IN BLOOD BY AUTOMATED COUNT: 11.9 % (ref 11.5–14.5)
GLOBULIN SER CALC-MCNC: 2.7 G/DL (ref 2–4)
GLUCOSE SERPL-MCNC: 70 MG/DL (ref 65–100)
HCT VFR BLD AUTO: 37.7 % (ref 36.6–50.3)
HCV AB SERPL QL IA: NONREACTIVE
HGB BLD-MCNC: 12.6 G/DL (ref 12.1–17)
HIV 1+2 AB+HIV1 P24 AG SERPL QL IA: NONREACTIVE
HIV12 RESULT COMMENT, HHIVC: NORMAL
IMM GRANULOCYTES # BLD AUTO: 0 K/UL (ref 0–0.04)
IMM GRANULOCYTES NFR BLD AUTO: 0 % (ref 0–0.5)
LYMPHOCYTES # BLD: 2.1 K/UL (ref 0.8–3.5)
LYMPHOCYTES NFR BLD: 29 % (ref 12–49)
MCH RBC QN AUTO: 29.9 PG (ref 26–34)
MCHC RBC AUTO-ENTMCNC: 33.4 G/DL (ref 30–36.5)
MCV RBC AUTO: 89.5 FL (ref 80–99)
MONOCYTES # BLD: 0.6 K/UL (ref 0–1)
MONOCYTES NFR BLD: 8 % (ref 5–13)
NEUTS SEG # BLD: 4 K/UL (ref 1.8–8)
NEUTS SEG NFR BLD: 56 % (ref 32–75)
NRBC # BLD: 0 K/UL (ref 0–0.01)
NRBC BLD-RTO: 0 PER 100 WBC
PLATELET # BLD AUTO: 250 K/UL (ref 150–400)
PMV BLD AUTO: 9.5 FL (ref 8.9–12.9)
POTASSIUM SERPL-SCNC: 4 MMOL/L (ref 3.5–5.1)
PROT SERPL-MCNC: 7.2 G/DL (ref 6.4–8.2)
RBC # BLD AUTO: 4.21 M/UL (ref 4.1–5.7)
SODIUM SERPL-SCNC: 135 MMOL/L (ref 136–145)
T4 SERPL-MCNC: 9.3 UG/DL (ref 4.5–12.1)
TSH SERPL DL<=0.05 MIU/L-ACNC: 1.45 UIU/ML (ref 0.36–3.74)
WBC # BLD AUTO: 7.2 K/UL (ref 4.1–11.1)

## 2022-11-19 LAB
M TB IFN-G BLD-IMP: NEGATIVE
QUANTIFERON CRITERIA, QFI1T: NORMAL
QUANTIFERON MITOGEN VALUE: >10 IU/ML
QUANTIFERON NIL VALUE: 0.02 IU/ML
QUANTIFERON TB1 AG: 0.06 IU/ML
QUANTIFERON TB2 AG: 0.04 IU/ML

## 2022-11-21 NOTE — PROGRESS NOTES
Please call patient:    All labs are normal/negative (sent [employer] message but it looks like he didn't read it), including HIV and TB. If symptoms continue, please let us know.